# Patient Record
Sex: FEMALE | Race: WHITE | Employment: OTHER | URBAN - METROPOLITAN AREA
[De-identification: names, ages, dates, MRNs, and addresses within clinical notes are randomized per-mention and may not be internally consistent; named-entity substitution may affect disease eponyms.]

---

## 2017-06-09 DIAGNOSIS — M41.20 OTHER IDIOPATHIC SCOLIOSIS: ICD-10-CM

## 2017-06-09 DIAGNOSIS — M48.061 SPINAL STENOSIS OF LUMBAR REGION: ICD-10-CM

## 2017-06-09 RX ORDER — TRAMADOL HYDROCHLORIDE 50 MG/1
50-100 TABLET ORAL EVERY 6 HOURS PRN
Qty: 40 TABLET | Refills: 0 | Status: SHIPPED | OUTPATIENT
Start: 2017-06-09 | End: 2018-05-03

## 2017-11-16 DIAGNOSIS — Z87.11 HISTORY OF GASTRIC ULCER: ICD-10-CM

## 2017-11-16 DIAGNOSIS — R10.13 DYSPEPSIA: ICD-10-CM

## 2017-11-16 RX ORDER — NICOTINE POLACRILEX 4 MG/1
20 GUM, CHEWING ORAL DAILY
Qty: 90 TABLET | Refills: 0 | Status: SHIPPED | OUTPATIENT
Start: 2017-11-16 | End: 2018-02-12

## 2018-02-12 DIAGNOSIS — Z87.11 HISTORY OF GASTRIC ULCER: ICD-10-CM

## 2018-02-12 DIAGNOSIS — R10.13 DYSPEPSIA: ICD-10-CM

## 2018-02-12 RX ORDER — NICOTINE POLACRILEX 4 MG/1
20 GUM, CHEWING ORAL DAILY
Qty: 90 TABLET | Refills: 0 | Status: SHIPPED | OUTPATIENT
Start: 2018-02-12 | End: 2018-05-03

## 2018-02-12 NOTE — TELEPHONE ENCOUNTER
OMEPRAZOLE---LAST RELATED APPOINTMENT AND SEEN WAS ON 11/17/16, PATIENT IS DUE FOR AN APPOINTMENT, I WILL CONTACT THE PATIENT

## 2018-05-03 ENCOUNTER — OFFICE VISIT (OUTPATIENT)
Dept: FAMILY MEDICINE | Facility: CLINIC | Age: 78
End: 2018-05-03

## 2018-05-03 VITALS
SYSTOLIC BLOOD PRESSURE: 118 MMHG | BODY MASS INDEX: 21.13 KG/M2 | HEART RATE: 60 BPM | HEIGHT: 64 IN | WEIGHT: 123.8 LBS | DIASTOLIC BLOOD PRESSURE: 70 MMHG

## 2018-05-03 DIAGNOSIS — M48.062 SPINAL STENOSIS OF LUMBAR REGION WITH NEUROGENIC CLAUDICATION: ICD-10-CM

## 2018-05-03 DIAGNOSIS — M85.859 OSTEOPENIA OF FEMORAL NECK: Primary | ICD-10-CM

## 2018-05-03 DIAGNOSIS — R10.13 DYSPEPSIA: ICD-10-CM

## 2018-05-03 DIAGNOSIS — Z87.11 HISTORY OF GASTRIC ULCER: ICD-10-CM

## 2018-05-03 LAB
% GRANULOCYTES: 66.5 % (ref 42.2–75.2)
HCT VFR BLD AUTO: 32.4 % (ref 35–46)
HEMOGLOBIN: 10.6 G/DL (ref 11.8–15.5)
LYMPHOCYTES NFR BLD AUTO: 24.7 % (ref 20.5–51.1)
MCH RBC QN AUTO: 27.1 PG (ref 27–31)
MCHC RBC AUTO-ENTMCNC: 32.9 G/DL (ref 33–37)
MCV RBC AUTO: 82.4 FL (ref 80–100)
MONOCYTES NFR BLD AUTO: 8.8 % (ref 1.7–9.3)
PLATELET # BLD AUTO: 282 K/UL (ref 140–450)
RBC # BLD AUTO: 3.93 X10/CMM (ref 3.7–5.2)
WBC # BLD AUTO: 4 X10/CMM (ref 3.8–11)

## 2018-05-03 PROCEDURE — 36415 COLL VENOUS BLD VENIPUNCTURE: CPT | Performed by: FAMILY MEDICINE

## 2018-05-03 PROCEDURE — 85025 COMPLETE CBC W/AUTO DIFF WBC: CPT | Performed by: FAMILY MEDICINE

## 2018-05-03 PROCEDURE — 99214 OFFICE O/P EST MOD 30 MIN: CPT | Performed by: FAMILY MEDICINE

## 2018-05-03 RX ORDER — NICOTINE POLACRILEX 4 MG/1
GUM, CHEWING ORAL
Qty: 100 TABLET | Refills: 3 | Status: SHIPPED | OUTPATIENT
Start: 2018-05-03 | End: 2018-05-17

## 2018-05-03 RX ORDER — TRAMADOL HYDROCHLORIDE 50 MG/1
50-100 TABLET ORAL EVERY 6 HOURS PRN
Qty: 40 TABLET | Refills: 0 | Status: SHIPPED | OUTPATIENT
Start: 2018-05-03 | End: 2018-07-12

## 2018-05-03 RX ORDER — PREDNISONE 20 MG/1
20 TABLET ORAL 2 TIMES DAILY
Qty: 10 TABLET | Refills: 0 | Status: SHIPPED | OUTPATIENT
Start: 2018-05-03 | End: 2018-07-12

## 2018-05-03 RX ORDER — NICOTINE POLACRILEX 4 MG/1
20 GUM, CHEWING ORAL DAILY
Qty: 90 TABLET | Refills: 0 | Status: SHIPPED | OUTPATIENT
Start: 2018-05-03 | End: 2018-05-03

## 2018-05-03 NOTE — PROGRESS NOTES
"Problem(s) Oriented visit        SUBJECTIVE:                                                    Zo Chan is a 77 year old female who presents to clinic today for medication refill. She has scoliosis and spinal stenosis. She has pain that radiates into her right buttock and leg. It feels okay if she sits, is worse when she stands or walks, typically felt as a dull ache, but can get sharp. Advil helps, but is too hard on her stomach. She has had good benefit with 1/2 tramadol when she has taken it in the past.     She also reports history of dyspepsia and gastric ulcer, so she needs to be careful with medications she takes. She had GI bleed in the past with resultant anemia. Her last blood test was in September 2016.      Problem list, Medication list, Allergies, and Medical/Social/Surgical histories reviewed in EPIC and updated as appropriate.   Additional history: as documented    ROS:  5 point ROS completed and negative except noted above, including Gen, CV, Resp, GI, MS      Histories:   Patient Active Problem List   Diagnosis     Idiopathic scoliosis     Anemia     Iron deficiency anemia due to chronic blood loss     Gastrointestinal hemorrhage associated with gastric ulcer     Spinal stenosis of lumbar region     History reviewed. No pertinent surgical history.    Social History   Substance Use Topics     Smoking status: Never Smoker     Smokeless tobacco: Never Used     Alcohol use No     History reviewed. No pertinent family history.        OBJECTIVE:                                                    /70  Pulse 60  Ht 1.632 m (5' 4.25\")  Wt 56.2 kg (123 lb 12.8 oz)  BMI 21.09 kg/m2  Body mass index is 21.09 kg/(m^2).   GENERAL APPEARANCE: Alert, no acute distress  RESP: lungs clear to auscultation   CV: normal rate, regular rhythm, no murmur or gallop  ABDOMEN: soft, no organomegaly, masses or tenderness  MS: marked scoliosis changes, motor strength in the LE is 5/5 and equal. Tender to " palpation in the right buttock and prox post thigh, Negative straight leg raise bilaterally  NEURO: Alert, oriented, speech and mentation normal  PSYCH: mentation appears normal, affect and mood normal   Labs Resulted Today:   Results for orders placed or performed in visit on 05/03/18   CBC with Diff/Plt ([facility])   Result Value Ref Range    WBC x10/cmm 4.0 3.8 - 11.0 x10/cmm    % Lymphocytes 24.7 20.5 - 51.1 %    % Monocytes 8.8 1.7 - 9.3 %    % Granulocytes 66.5 42.2 - 75.2 %    RBC x10/cmm 3.93 3.7 - 5.2 x10/cmm    Hemoglobin 10.6 (A) 11.8 - 15.5 g/dl    Hematocrit 32.4 (A) 35 - 46 %    MCV 82.4 80 - 100 fL    MCH 27.1 27.0 - 31.0 pg    MCHC 32.9 (A) 33.0 - 37.0 g/dL    Platelet Count 282 140 - 450 K/uL     ASSESSMENT/PLAN:                                                        Zo was seen today for recheck medication and imm/inj.    Diagnoses and all orders for this visit:    Osteopenia of femoral neck  -     traMADol (ULTRAM) 50 MG tablet; Take 1-2 tablets ( mg) by mouth every 6 hours as needed for moderate pain  -     Vitamin D  25-Hydroxy ([facility])  -     Referral to [facility] for DEXA. If worsening, recommend starting Prolia.    Dyspepsia, need to take caution starting prednisone.   -     CBC with Diff/Plt ([facility]), establish baseline H/H, increase omeprazole to BID when taking prednisone.  -     omeprazole 20 MG tablet; 1-2 po daily    History of gastric ulcer  -     omeprazole 20 MG tablet; 1-2 po daily    Spinal stenosis of lumbar region with neurogenic claudication  -     traMADol (ULTRAM) 50 MG tablet; Take 1-2 tablets ( mg) by mouth every 6 hours as needed for moderate pain  -     predniSONE (DELTASONE) 20 MG tablet; Take 1 tablet (20 mg) by mouth 2 times daily  See below.       Patient Instructions   Take prednisone with food.   While taking prednisone, please increase omeprazole to twice daily.      The following health maintenance items are reviewed in Epic and correct as of  today:  Health Maintenance   Topic Date Due     TETANUS IMMUNIZATION (SYSTEM ASSIGNED)  12/12/1959     ADVANCE DIRECTIVE PLANNING Q5 YRS  12/12/1996     PNEUMOCOCCAL (1 of 2 - PCV13) 12/12/2006     FALL RISK ASSESSMENT  01/13/2017     INFLUENZA VACCINE (Season Ended) 09/01/2018     LIPID SCREEN Q5 YR FEMALE (SYSTEM ASSIGNED)  01/12/2021     DEXA SCAN SCREENING (SYSTEM ASSIGNED)  Completed       Allison Maxwell MD  [facility]  Family Practice  [facility]  [phone]    For any issues my office # is [phone]

## 2018-05-03 NOTE — MR AVS SNAPSHOT
After Visit Summary   5/3/2018    Mrs. Zo Chna    MRN: [id number]           Patient Information     Date Of Birth          1940        Visit Information        Provider Department      5/3/2018 12:30 PM Allison Maxwell MD [facility]        Today's Diagnoses     Osteopenia of femoral neck    -  1    Dyspepsia        History of gastric ulcer        Spinal stenosis of lumbar region with neurogenic claudication          Care Instructions    Take prednisone with food.   While taking prednisone, please increase omeprazole to twice daily.          Follow-ups after your visit        Who to contact     If you have questions or need follow up information about today's clinic visit or your schedule please contact [facility] directly at [phone].  Normal or non-critical lab and imaging results will be communicated to you by [employer], letter or phone within 4 business days after the clinic has received the results. If you do not hear from us within 7 days, please contact the clinic through [employer] or phone. If you have a critical or abnormal lab result, we will notify you by phone as soon as possible.  Submit refill requests through [employer] or call your pharmacy and they will forward the refill request to us. Please allow 3 business days for your refill to be completed.          Additional Information About Your Visit        MyChart Information     [employer] gives you secure access to your electronic health record. If you see a primary care provider, you can also send messages to your care team and make appointments. If you have questions, please call your primary care clinic.  If you do not have a primary care provider, please call [phone] and they will assist you.        Care EveryWhere ID     This is your Care EveryWhere ID. This could be used by other organizations to access your [facility] medical records  [id number]        Your Vitals Were     Pulse  "Height BMI (Body Mass Index)             60 1.632 m (5' 4.25\") 21.09 kg/m2          Blood Pressure from Last 3 Encounters:   05/03/18 118/70   11/17/16 118/78   09/16/16 120/80    Weight from Last 3 Encounters:   05/03/18 56.2 kg (123 lb 12.8 oz)   11/17/16 55.3 kg (122 lb)   09/16/16 55.8 kg (123 lb)              We Performed the Following     CBC with Diff/Plt ([facility])     Vitamin D  25-Hydroxy ([facility])          Today's Medication Changes          These changes are accurate as of 5/3/18  1:06 PM.  If you have any questions, ask your nurse or doctor.               Start taking these medicines.        Dose/Directions    omeprazole 20 MG tablet   Used for:  Dyspepsia, History of gastric ulcer   Started by:  Allison Maxwell MD        1-2 po daily   Quantity:  100 tablet   Refills:  3       predniSONE 20 MG tablet   Commonly known as:  DELTASONE   Used for:  Spinal stenosis of lumbar region with neurogenic claudication   Started by:  Allison Maxwell MD        Dose:  20 mg   Take 1 tablet (20 mg) by mouth 2 times daily   Quantity:  10 tablet   Refills:  0            Where to get your medicines      These medications were sent to [address] [address]    Hours:  Tech issues with their phone system Phone:  [phone]     omeprazole 20 MG tablet    predniSONE 20 MG tablet         Some of these will need a paper prescription and others can be bought over the counter.  Ask your nurse if you have questions.     Bring a paper prescription for each of these medications     traMADol 50 MG tablet               Information about OPIOIDS     PRESCRIPTION OPIOIDS: WHAT YOU NEED TO KNOW   You have a prescription for an opioid (narcotic) pain medicine. Opioids can cause addiction. If you have a history of chemical dependency of any type, you are at a higher risk of becoming addicted to opioids. Only take this medicine after all other options " have been tried. Take it for as short a time and as few doses as possible.     Do not:    Drive. If you drive while taking these medicines, you could be arrested for driving under the influence (DUI).    Operate heavy machinery    Do any other dangerous activities while taking these medicines.     Drink any alcohol while taking these medicines.      Take with any other medicines that contain acetaminophen. Read all labels carefully. Look for the word  acetaminophen  or  Tylenol.  Ask your pharmacist if you have questions or are unsure.    Store your pills in a secure place, locked if possible. We will not replace any lost or stolen medicine. If you don t finish your medicine, please throw away (dispose) as directed by your pharmacist. The [place] Pollution Control Agency has more information about safe disposal: https://www.pca.[facility].[place]/living-green/managing-unwanted-medications    All opioids tend to cause constipation. Drink plenty of water and eat foods that have a lot of fiber, such as fruits, vegetables, prune juice, apple juice and high-fiber cereal. Take a laxative (Miralax, milk of magnesia, Colace, Senna) if you don t move your bowels at least every other day.          Primary Care Provider Office Phone # Fax #    Allison Ines Maxwell [phone] [address] [place] [address]        Equal Access to Services     DARBY SHAW AH: Hadii soila huizar hadasho Soomaali, waaxda luqadaha, qaybta kaalmada adeegyada, veronica cadet hayaustin rivero. So [facility] [phone].    ATENCIÓN: Si habla español, tiene a araiza disposición servicios gratuitos de asistencia lingüística. Llame al [phone].    We comply with applicable federal civil rights laws and [place] laws. We do not discriminate on the basis of race, color, national origin, age, disability, sex, sexual orientation, or gender identity.            Thank you!     Thank you for choosing [facility]   for your care. Our goal is always to provide you with excellent care. Hearing back from our patients is one way we can continue to improve our services. Please take a few minutes to complete the written survey that you may receive in the mail after your visit with us. Thank you!             Your Updated Medication List - Protect others around you: Learn how to safely use, store and throw away your medicines at www.disposemymeds.org.          This list is accurate as of 5/3/18  1:06 PM.  Always use your most recent med list.                   Brand Name Dispense Instructions for use Diagnosis    B-12 PO      Take 1 tablet by mouth daily        CLARITIN-D 12 HOUR PO      Take 1 tablet by mouth as needed        magnesium 250 MG tablet     30 tablet    Take 1 tablet by mouth daily    Low magnesium levels       OMEGA-3 FISH OIL PO      Take 2 capsules by mouth daily        omeprazole 20 MG tablet     100 tablet    1-2 po daily    Dyspepsia, History of gastric ulcer       predniSONE 20 MG tablet    DELTASONE    10 tablet    Take 1 tablet (20 mg) by mouth 2 times daily    Spinal stenosis of lumbar region with neurogenic claudication       traMADol 50 MG tablet    ULTRAM    40 tablet    Take 1-2 tablets ( mg) by mouth every 6 hours as needed for moderate pain    Spinal stenosis of lumbar region with neurogenic claudication, Osteopenia of femoral neck       triamcinolone 0.1 % ointment    KENALOG    80 g    Apply sparingly to affected area BID    Dermatitis       vitamin D 2000 units tablet     100 tablet    Take 2 tabs po qd.    Low vitamin D level

## 2018-05-04 LAB — VITAMIN D, 25-HYDROXY: 43.5 NG/ML (ref 30–100)

## 2018-05-08 ENCOUNTER — TELEPHONE (OUTPATIENT)
Dept: FAMILY MEDICINE | Facility: CLINIC | Age: 78
End: 2018-05-08

## 2018-05-08 DIAGNOSIS — D64.9 LOW HEMOGLOBIN: Primary | ICD-10-CM

## 2018-05-08 DIAGNOSIS — D64.9 LOW HEMATOCRIT: ICD-10-CM

## 2018-05-08 NOTE — TELEPHONE ENCOUNTER
----- Message from Allison Maxwell MD sent at 5/4/2018 10:49 AM CDT -----  Blood counts are low again, we need to find out why. To do so, we should check your stool for blood. If you come by our office we can give you hemassure to check for this. We should also check your iron levels and B12 level.    Vitamin D level is pretty good. I mentioned that with your degree of osteoporosis I wanted your level to be in the 50-70 range. You could increase your vitamin D supplement by and extra 2,000 IU 2-3 times weekly to achieve this.

## 2018-05-08 NOTE — TELEPHONE ENCOUNTER
Patient returned phone call to discuss lab results.  She had previously viewed results via [employer].  She is scheduled for lab appointment on 5/10/18 for labs and will  Hemosure kit at that time as well. She is also advised to increase vitamin D by 2000IU twice weekly.  Liza Carr

## 2018-05-10 DIAGNOSIS — D64.9 LOW HEMOGLOBIN: ICD-10-CM

## 2018-05-10 DIAGNOSIS — D64.9 LOW HEMATOCRIT: ICD-10-CM

## 2018-05-10 PROCEDURE — 36415 COLL VENOUS BLD VENIPUNCTURE: CPT | Performed by: FAMILY MEDICINE

## 2018-05-11 LAB
IRON BINDING CAP: 387 UG/DL (ref 250–450)
IRON SATURATION: 7 % (ref 15–55)
IRON: 26 UG/DL (ref 27–139)
UIBC: 361 UG/DL (ref 118–369)
VIT B12 SERPL-MCNC: >2000 PG/ML (ref 232–1245)

## 2018-05-14 ENCOUNTER — TELEPHONE (OUTPATIENT)
Dept: FAMILY MEDICINE | Facility: CLINIC | Age: 78
End: 2018-05-14

## 2018-05-14 DIAGNOSIS — K59.00 CONSTIPATION: ICD-10-CM

## 2018-05-14 DIAGNOSIS — E61.1 LOW IRON: Primary | ICD-10-CM

## 2018-05-14 RX ORDER — FERROUS SULFATE 325(65) MG
325 TABLET ORAL 2 TIMES DAILY
Qty: 60 TABLET | Refills: 2 | COMMUNITY
Start: 2018-05-14 | End: 2020-01-01

## 2018-05-14 RX ORDER — ASPIRIN 81 MG
TABLET, DELAYED RELEASE (ENTERIC COATED) ORAL
Qty: 60 TABLET | Refills: 1 | COMMUNITY
Start: 2018-05-14 | End: 2018-07-12

## 2018-05-14 NOTE — TELEPHONE ENCOUNTER
Called patient with results and she had already read  them on [employer].  She will start the ferrous sulfate and colace.  Patient wants to hold off on the scheduling of the EGD and colonoscopy until the result of hemosure gets back-patient will drop off tomorrow.  Informed patient she will most likely still need the tests and should make sure that they get scheduled.  Patient will also stop her Vit B12 for awhile.

## 2018-05-15 DIAGNOSIS — D64.9 LOW HEMOGLOBIN: ICD-10-CM

## 2018-05-15 DIAGNOSIS — D64.9 LOW HEMATOCRIT: ICD-10-CM

## 2018-05-15 LAB — HEMOCCULT STL QL: NORMAL

## 2018-05-15 PROCEDURE — 82274 ASSAY TEST FOR BLOOD FECAL: CPT | Performed by: FAMILY MEDICINE

## 2018-05-17 ENCOUNTER — TRANSFERRED RECORDS (OUTPATIENT)
Dept: FAMILY MEDICINE | Facility: CLINIC | Age: 78
End: 2018-05-17

## 2018-05-17 DIAGNOSIS — Z87.11 HISTORY OF GASTRIC ULCER: ICD-10-CM

## 2018-05-17 DIAGNOSIS — R10.13 DYSPEPSIA: ICD-10-CM

## 2018-05-17 RX ORDER — NICOTINE POLACRILEX 4 MG/1
GUM, CHEWING ORAL
Qty: 100 TABLET | Refills: 3 | Status: SHIPPED | OUTPATIENT
Start: 2018-05-17 | End: 2018-09-14

## 2018-05-24 ENCOUNTER — TELEPHONE (OUTPATIENT)
Dept: FAMILY MEDICINE | Facility: CLINIC | Age: 78
End: 2018-05-24

## 2018-05-24 NOTE — TELEPHONE ENCOUNTER
Called patient with results of bone dexa scan.  Informed her of mild osteoporosis in spine and R hip and moderate osteoporosis of L hip.  Recommended is for patient to start Prolia.  Patient says she wants to research this and think about it and will get back to us if she wants to start this.  Informed her that because of her scoliosis if she fell it could be very devastating for her.

## 2018-07-12 ENCOUNTER — OFFICE VISIT (OUTPATIENT)
Dept: FAMILY MEDICINE | Facility: CLINIC | Age: 78
End: 2018-07-12

## 2018-07-12 VITALS
DIASTOLIC BLOOD PRESSURE: 68 MMHG | SYSTOLIC BLOOD PRESSURE: 118 MMHG | BODY MASS INDEX: 20.98 KG/M2 | HEART RATE: 68 BPM | WEIGHT: 123.2 LBS

## 2018-07-12 DIAGNOSIS — E55.9 VITAMIN D DEFICIENCY: ICD-10-CM

## 2018-07-12 DIAGNOSIS — K25.4 GASTROINTESTINAL HEMORRHAGE ASSOCIATED WITH GASTRIC ULCER: ICD-10-CM

## 2018-07-12 DIAGNOSIS — M85.859 OSTEOPENIA OF FEMORAL NECK: ICD-10-CM

## 2018-07-12 DIAGNOSIS — M48.062 SPINAL STENOSIS OF LUMBAR REGION WITH NEUROGENIC CLAUDICATION: ICD-10-CM

## 2018-07-12 DIAGNOSIS — D50.0 IRON DEFICIENCY ANEMIA DUE TO CHRONIC BLOOD LOSS: Primary | ICD-10-CM

## 2018-07-12 LAB
% GRANULOCYTES: 70 % (ref 42.2–75.2)
HCT VFR BLD AUTO: 31.4 % (ref 35–46)
HEMOGLOBIN: 10.2 G/DL (ref 11.8–15.5)
LYMPHOCYTES NFR BLD AUTO: 23 % (ref 20.5–51.1)
MCH RBC QN AUTO: 26.3 PG (ref 27–31)
MCHC RBC AUTO-ENTMCNC: 32.6 G/DL (ref 33–37)
MCV RBC AUTO: 80.5 FL (ref 80–100)
MONOCYTES NFR BLD AUTO: 7 % (ref 1.7–9.3)
PLATELET # BLD AUTO: 302 K/UL (ref 140–450)
RBC # BLD AUTO: 3.9 X10/CMM (ref 3.7–5.2)
WBC # BLD AUTO: 3.6 X10/CMM (ref 3.8–11)

## 2018-07-12 PROCEDURE — 99214 OFFICE O/P EST MOD 30 MIN: CPT | Performed by: FAMILY MEDICINE

## 2018-07-12 PROCEDURE — 36415 COLL VENOUS BLD VENIPUNCTURE: CPT | Performed by: FAMILY MEDICINE

## 2018-07-12 PROCEDURE — 85025 COMPLETE CBC W/AUTO DIFF WBC: CPT | Performed by: FAMILY MEDICINE

## 2018-07-12 RX ORDER — TRAMADOL HYDROCHLORIDE 50 MG/1
50-100 TABLET ORAL EVERY 6 HOURS PRN
Qty: 60 TABLET | Refills: 0 | Status: SHIPPED | OUTPATIENT
Start: 2018-07-12 | End: 2018-11-28

## 2018-07-12 NOTE — PROGRESS NOTES
Problem(s) Oriented visit        SUBJECTIVE:                                                    Zo Chan is a 77 year old female who presents to clinic today for follow up on labs. She had a lower blood count and was asked to set up an EGD. She instead stopped taking Advil and wanted to have her blood counts rechecked prior to the EGD. She had prior large gastric ulcer. She has severe spinal stenosis and scoliosis and cannot get any effect from the Tylenol. She does get adequate relief from Tramadol, taking 1/2 tab, and never drives after taking this. She does not take it every day. She is taking iron supplement, but this makes her constipated. She is trying to drink enough water and taking a fiber supplement.       Problem list, Medication list, Allergies, and Medical/Social/Surgical histories reviewed in EPIC and updated as appropriate.   Additional history: as documented    ROS:  5 point ROS completed and negative except noted above, including Gen, CV, Resp, GI, MS      Histories:   Patient Active Problem List   Diagnosis     Idiopathic scoliosis     Anemia     Iron deficiency anemia due to chronic blood loss     Gastrointestinal hemorrhage associated with gastric ulcer     Spinal stenosis of lumbar region     Vitamin D deficiency     No past surgical history on file.    Social History   Substance Use Topics     Smoking status: Never Smoker     Smokeless tobacco: Never Used     Alcohol use No     No family history on file.        OBJECTIVE:                                                    /68  Pulse 68  Wt 55.9 kg (123 lb 3.2 oz)  BMI 20.98 kg/m2  Body mass index is 20.98 kg/(m^2).   GENERAL APPEARANCE: Alert, no acute distress  RESP: lungs clear to auscultation   CV: normal rate, regular rhythm, no murmur or gallop  ABDOMEN: soft, no organomegaly, masses or tenderness  MS: changes due to spinal stenosis and scoliosis noted  NEURO: Alert, oriented, speech and mentation normal  PSYCH:  mentation appears normal, affect and mood normal   Labs Resulted Today:   Results for orders placed or performed in visit on 05/15/18   Hemosure - IFOB ([facility])   Result Value Ref Range    Occult Blood neg neg     ASSESSMENT/PLAN:                                                        Zo was seen today for recheck.    Diagnoses and all orders for this visit:    Iron deficiency anemia due to chronic blood loss  -     CBC with Diff/Plt ([facility])  -     Iron and TIBC ([facility])  -     Vitamin B12 ([facility])  Recheck all, hopefully with improved blood counts. If still iron deficiency anemia will need to arrange for evaluation of colon and EGD.    Spinal stenosis of lumbar region with neurogenic claudication  -     traMADol (ULTRAM) 50 MG tablet; Take 1-2 tablets ( mg) by mouth every 6 hours as needed for moderate pain    Osteopenia of femoral neck  -     traMADol (ULTRAM) 50 MG tablet; Take 1-2 tablets ( mg) by mouth every 6 hours as needed for moderate pain    Gastrointestinal hemorrhage associated with gastric ulcer  Feeling better, hopefully labs will reflect this.    Vitamin D deficiency  -     Vitamin D  25-Hydroxy ([facility])        There are no Patient Instructions on file for this visit.    The following health maintenance items are reviewed in Epic and correct as of today:  Health Maintenance   Topic Date Due     PHQ-2 Q1 YR  12/12/1952     TETANUS IMMUNIZATION (SYSTEM ASSIGNED)  12/12/1959     ADVANCE DIRECTIVE PLANNING Q5 YRS  12/12/1996     PNEUMOCOCCAL (1 of 2 - PCV13) 12/12/2006     INFLUENZA VACCINE (1) 09/01/2018     FALL RISK ASSESSMENT  05/03/2019     LIPID SCREEN Q5 YR FEMALE (SYSTEM ASSIGNED)  01/12/2021     DEXA SCAN SCREENING (SYSTEM ASSIGNED)  Completed       Allison Maxwell MD  [facility]  Family Practice  [facility]  [phone]    For any issues my office # is [phone]

## 2018-07-12 NOTE — MR AVS SNAPSHOT
After Visit Summary   7/12/2018    Mrs. Zo Chan    MRN: [id number]           Patient Information     Date Of Birth          1940        Visit Information        Provider Department      7/12/2018 12:30 PM Allison Maxwell MD [facility]        Today's Diagnoses     Iron deficiency anemia due to chronic blood loss    -  1    Spinal stenosis of lumbar region with neurogenic claudication        Osteopenia of femoral neck        Gastrointestinal hemorrhage associated with gastric ulcer        Vitamin D deficiency           Follow-ups after your visit        Who to contact     If you have questions or need follow up information about today's clinic visit or your schedule please contact [facility] directly at [phone].  Normal or non-critical lab and imaging results will be communicated to you by Picturkhart, letter or phone within 4 business days after the clinic has received the results. If you do not hear from us within 7 days, please contact the clinic through [employer] [place] [place] [employer] or phone. If you have a critical or abnormal lab result, we will notify you by phone as soon as possible.  Submit refill requests through [employer]iwinSoft or call your pharmacy and they will forward the refill request to us. Please allow 3 business days for your refill to be completed.          Additional Information About Your Visit        MyChart Information     [employer] gives you secure access to your electronic health record. If you see a primary care provider, you can also send messages to your care team and make appointments. If you have questions, please call your primary care clinic.  If you do not have a primary care provider, please call [phone] and they will assist you.        Care EveryWhere ID     This is your Care EveryWhere ID. This could be used by other organizations to access your [facility] medical records  [id number]        Your Vitals Were     Pulse BMI (Body Mass Index)                 68 20.98 kg/m2           Blood Pressure from Last 3 Encounters:   07/12/18 118/68   05/03/18 118/70   11/17/16 118/78    Weight from Last 3 Encounters:   07/12/18 55.9 kg (123 lb 3.2 oz)   05/03/18 56.2 kg (123 lb 12.8 oz)   11/17/16 55.3 kg (122 lb)              We Performed the Following     CBC with Diff/Plt ([facility])     Iron and TIBC ([facility])     Vitamin B12 ([facility])     Vitamin D  25-Hydroxy ([facility])          Where to get your medicines      Some of these will need a paper prescription and others can be bought over the counter.  Ask your nurse if you have questions.     Bring a paper prescription for each of these medications     traMADol 50 MG tablet         Information about OPIOIDS     PRESCRIPTION OPIOIDS: WHAT YOU NEED TO KNOW   We gave you an opioid (narcotic) pain medicine. It is important to manage your pain, but opioids are not always the best choice. You should first try all the other options your care team gave you. Take this medicine for as short a time (and as few doses) as possible.     These medicines have risks:    DO NOT drive when on new or higher doses of pain medicine. These medicines can affect your alertness and reaction times, and you could be arrested for driving under the influence (DUI). If you need to use opioids long-term, talk to your care team about driving.    DO NOT operate heave machinery    DO NOT do any other dangerous activities while taking these medicines.     DO NOT drink any alcohol while taking these medicines.      If the opioid prescribed includes acetaminophen, DO NOT take with any other medicines that contain acetaminophen. Read all labels carefully. Look for the word  acetaminophen  or  Tylenol.  Ask your pharmacist if you have questions or are unsure.    You can get addicted to pain medicines, especially if you have a history of addiction (chemical, alcohol or substance dependence). Talk to your care team about ways to reduce this risk.    Store your pills  in a secure place, locked if possible. We will not replace any lost or stolen medicine. If you don t finish your medicine, please throw away (dispose) as directed by your pharmacist. The [place] Pollution Control Agency has more information about safe disposal: https://www.pca.[facility].[place].us/living-green/managing-unwanted-medications.     All opioids tend to cause constipation. Drink plenty of water and eat foods that have a lot of fiber, such as fruits, vegetables, prune juice, apple juice and high-fiber cereal. Take a laxative (Miralax, milk of magnesia, Colace, Senna) if you don t move your bowels at least every other day.          Primary Care Provider Office Phone # Fax #    Allison Ines Maxwell [phone] [address] [place] [address]        Equal Access to Services     DARBY SHAW : Hadii osila huizar hadasho Sobecky, waaxda luqadaha, qaybta kaalmada adeegyada, veronica cadet hayaustin kasper . So [facility] [phone].    ATENCIÓN: Si habla español, tiene a araiza disposición servicios gratuitos de asistencia lingüística. Llame al [phone].    We comply with applicable federal civil rights laws and [place] laws. We do not discriminate on the basis of race, color, national origin, age, disability, sex, sexual orientation, or gender identity.            Thank you!     Thank you for choosing [facility]  for your care. Our goal is always to provide you with excellent care. Hearing back from our patients is one way we can continue to improve our services. Please take a few minutes to complete the written survey that you may receive in the mail after your visit with us. Thank you!             Your Updated Medication List - Protect others around you: Learn how to safely use, store and throw away your medicines at www.disposemymeds.org.          This list is accurate as of 7/12/18 11:59 PM.  Always use your most recent med list.                   Brand Name  Dispense Instructions for use Diagnosis    CLARITIN-D 12 HOUR PO      Take 1 tablet by mouth as needed        ferrous sulfate 325 (65 Fe) MG tablet    IRON    60 tablet    Take 1 tablet (325 mg) by mouth 2 times daily    Low iron       magnesium 250 MG tablet     30 tablet    Take 1 tablet by mouth daily    Low magnesium levels       OMEGA-3 FISH OIL PO      Take 2 capsules by mouth daily        omeprazole 20 MG tablet     100 tablet    1-2 po daily    Dyspepsia, History of gastric ulcer       traMADol 50 MG tablet    ULTRAM    60 tablet    Take 1-2 tablets ( mg) by mouth every 6 hours as needed for moderate pain    Spinal stenosis of lumbar region with neurogenic claudication, Osteopenia of femoral neck       triamcinolone 0.1 % ointment    KENALOG    80 g    Apply sparingly to affected area BID    Dermatitis       vitamin D 2000 units tablet     100 tablet    Take 2 tabs po qd.    Low vitamin D level

## 2018-07-13 LAB
IRON BINDING CAP: 432 UG/DL (ref 250–450)
IRON SATURATION: 6 % (ref 15–55)
IRON: 26 UG/DL (ref 27–139)
UIBC: 406 UG/DL (ref 118–369)
VIT B12 SERPL-MCNC: 1331 PG/ML (ref 232–1245)
VITAMIN D, 25-HYDROXY: 45.7 NG/ML (ref 30–100)

## 2018-07-16 ENCOUNTER — TELEPHONE (OUTPATIENT)
Dept: FAMILY MEDICINE | Facility: CLINIC | Age: 78
End: 2018-07-16

## 2018-07-16 DIAGNOSIS — D64.9 ANEMIA: Primary | ICD-10-CM

## 2018-07-16 NOTE — TELEPHONE ENCOUNTER
Called patient with lab results. She had read results on RageTankt.  Order put in to [facility] for upper endoscopy.  They will call patient to schedule. Patient will continue taking her Omeprazole and avoid NSAIDS.

## 2018-07-27 ENCOUNTER — TELEPHONE (OUTPATIENT)
Dept: FAMILY MEDICINE | Facility: CLINIC | Age: 78
End: 2018-07-27

## 2018-07-27 NOTE — TELEPHONE ENCOUNTER
Patient called clinic to update.  Patient was scheduled for EGD on 7/24/18 at [facility], prior to procedure /102, dropped to 198/98 with PO fluids.  Patient decided not to proceed with EGD at that time.  She calls to report that she is feeling fine and would like to not have EGD done at this time.  She has increased omeprazole to 20mg BID and is taking daily Fe. Spoke with Dr. Maxwell who does feel that EGD is still necessary and patient is encouraged to proceed with procedure-patient declines.  Discussed with patient that she should RTC next month for recheck labs and discussion with Dr. Maxwell about EGD-patient agrees and will call to schedule this.  Liza Carr

## 2018-09-13 ENCOUNTER — OFFICE VISIT (OUTPATIENT)
Dept: FAMILY MEDICINE | Facility: CLINIC | Age: 78
End: 2018-09-13

## 2018-09-13 VITALS
SYSTOLIC BLOOD PRESSURE: 126 MMHG | RESPIRATION RATE: 12 BRPM | DIASTOLIC BLOOD PRESSURE: 72 MMHG | WEIGHT: 121.6 LBS | BODY MASS INDEX: 20.71 KG/M2 | HEART RATE: 72 BPM

## 2018-09-13 DIAGNOSIS — Z13.6 SCREENING FOR CARDIOVASCULAR CONDITION: ICD-10-CM

## 2018-09-13 DIAGNOSIS — D50.0 IRON DEFICIENCY ANEMIA DUE TO CHRONIC BLOOD LOSS: Primary | ICD-10-CM

## 2018-09-13 DIAGNOSIS — E87.1 HYPONATREMIA: ICD-10-CM

## 2018-09-13 DIAGNOSIS — K27.9 PEPTIC ULCER DISEASE: ICD-10-CM

## 2018-09-13 DIAGNOSIS — E67.8 HYPERVITAMINOSIS, B COMPLEX: ICD-10-CM

## 2018-09-13 LAB
% GRANULOCYTES: 72.6 % (ref 42.2–75.2)
HCT VFR BLD AUTO: 37.8 % (ref 35–46)
HEMOGLOBIN: 12.4 G/DL (ref 11.8–15.5)
LYMPHOCYTES NFR BLD AUTO: 19.9 % (ref 20.5–51.1)
MCH RBC QN AUTO: 28.5 PG (ref 27–31)
MCHC RBC AUTO-ENTMCNC: 32.9 G/DL (ref 33–37)
MCV RBC AUTO: 86.8 FL (ref 80–100)
MONOCYTES NFR BLD AUTO: 7.5 % (ref 1.7–9.3)
PLATELET # BLD AUTO: 230 K/UL (ref 140–450)
RBC # BLD AUTO: 4.35 X10/CMM (ref 3.7–5.2)
WBC # BLD AUTO: 3.9 X10/CMM (ref 3.8–11)

## 2018-09-13 PROCEDURE — 85025 COMPLETE CBC W/AUTO DIFF WBC: CPT | Performed by: FAMILY MEDICINE

## 2018-09-13 PROCEDURE — 36415 COLL VENOUS BLD VENIPUNCTURE: CPT | Performed by: FAMILY MEDICINE

## 2018-09-13 PROCEDURE — 99213 OFFICE O/P EST LOW 20 MIN: CPT | Performed by: FAMILY MEDICINE

## 2018-09-13 NOTE — PROGRESS NOTES
Problem(s) Oriented visit        SUBJECTIVE:                                                    Zo Chan is a 77 year old female who presents to clinic today for recheck of iron deficiency anemia. She had EGD showing an ulcer. She took omeprazole and now has no abdominal pain. She has now been taking iron supplement twice daily. She has not used any Advil since this was diagnosed. She is currently using 1/2 tramadol once daily. She typically drinks three cups of caffeinated coffee daily, down from her typical all day coffee use.       Problem list, Medication list, Allergies, and Medical/Social/Surgical histories reviewed in [facility] and updated as appropriate.   Additional history: as documented    ROS:  5 point ROS completed and negative except noted above, including Gen, CV, Resp, GI, MS      Histories:   Patient Active Problem List   Diagnosis     Idiopathic scoliosis     Anemia     Iron deficiency anemia due to chronic blood loss     Gastrointestinal hemorrhage associated with gastric ulcer     Spinal stenosis of lumbar region     Vitamin D deficiency     No past surgical history on file.    Social History   Substance Use Topics     Smoking status: Never Smoker     Smokeless tobacco: Never Used     Alcohol use No     No family history on file.        OBJECTIVE:                                                    /72  Pulse 72  Resp 12  Wt 55.2 kg (121 lb 9.6 oz)  BMI 20.71 kg/m2  Body mass index is 20.71 kg/(m^2).   GENERAL APPEARANCE: Alert, no acute distress  ABDOMEN: soft, no organomegaly, masses or tenderness  MS: extremities normal, no peripheral edema  NEURO: Alert, oriented, speech and mentation normal  PSYCH: mentation appears normal, affect and mood normal   Labs Resulted Today:   Results for orders placed or performed in visit on 09/13/18   CBC with Diff/Plt ([facility])   Result Value Ref Range    WBC x10/cmm 3.9 3.8 - 11.0 x10/cmm    % Lymphocytes 19.9 (A) 20.5 - 51.1 %    % Monocytes 7.5  1.7 - 9.3 %    % Granulocytes 72.6 42.2 - 75.2 %    RBC x10/cmm 4.35 3.7 - 5.2 x10/cmm    Hemoglobin 12.4 11.8 - 15.5 g/dl    Hematocrit 37.8 35 - 46 %    MCV 86.8 80 - 100 fL    MCH 28.5 27.0 - 31.0 pg    MCHC 32.9 (A) 33.0 - 37.0 g/dL    Platelet Count 230 140 - 450 K/uL     ASSESSMENT/PLAN:                                                        Zo was seen today for results.    Diagnoses and all orders for this visit:    PUD  Continue omeprazole, lifestyle modifications were also discussed.    Iron deficiency anemia due to chronic blood loss   -     Iron and TIBC ([facility])   -     CBC with Diff/Plt ([facility])  Resolved anemia, await iron results to know if iron supplement is still appropriate.    Hypervitaminosis, B complex  -     Vitamin B12 ([facility])  Previously on vitamin B supplement, but with B12 well above normal limit.  She has stopped the vitamin B supplement and needs to know if she should restart.    Screening for cardiovascular condition  -     Comp. Metabolic Panel (14) ([facility])        There are no Patient Instructions on file for this visit.    The following health maintenance items are reviewed in Epic and correct as of today:  Health Maintenance   Topic Date Due     PHQ-2 Q1 YR  12/12/1953     TETANUS IMMUNIZATION (SYSTEM ASSIGNED)  12/12/1959     ADVANCE DIRECTIVE PLANNING Q5 YRS  12/12/1996     PNEUMOCOCCAL (1 of 2 - PCV13) 12/12/2006     INFLUENZA VACCINE (1) 09/01/2018     FALL RISK ASSESSMENT  05/03/2019     LIPID SCREEN Q5 YR FEMALE (SYSTEM ASSIGNED)  01/12/2021     DEXA SCAN SCREENING (SYSTEM ASSIGNED)  Completed       Allison Maxwell MD  [facility]  Family Practice  [facility]  [phone]    For any issues my office # is [phone]

## 2018-09-13 NOTE — MR AVS SNAPSHOT
After Visit Summary   9/13/2018    Mrs. Zo Chan    MRN: [id number]           Patient Information     Date Of Birth          1940        Visit Information        Provider Department      9/13/2018 12:30 PM Allison Maxwell MD [facility]        Today's Diagnoses     Iron deficiency anemia due to chronic blood loss    -  1    Hypervitaminosis, B complex        Screening for cardiovascular condition        Peptic ulcer disease           Follow-ups after your visit        Who to contact     If you have questions or need follow up information about today's clinic visit or your schedule please contact [facility] directly at [phone].  Normal or non-critical lab and imaging results will be communicated to you by [employer], letter or phone within 4 business days after the clinic has received the results. If you do not hear from us within 7 days, please contact the clinic through [employer] or phone. If you have a critical or abnormal lab result, we will notify you by phone as soon as possible.  Submit refill requests through [employer] or call your pharmacy and they will forward the refill request to us. Please allow 3 business days for your refill to be completed.          Additional Information About Your Visit        MyChart Information     [employer] gives you secure access to your electronic health record. If you see a primary care provider, you can also send messages to your care team and make appointments. If you have questions, please call your primary care clinic.  If you do not have a primary care provider, please call [phone] and they will assist you.        Care EveryWhere ID     This is your Care EveryWhere ID. This could be used by other organizations to access your [place] medical records  [id number]        Your Vitals Were     Pulse Respirations BMI (Body Mass Index)             72 12 20.71 kg/m2          Blood Pressure from Last 3 Encounters:    09/13/18 126/72   07/12/18 118/68   05/03/18 118/70    Weight from Last 3 Encounters:   09/13/18 55.2 kg (121 lb 9.6 oz)   07/12/18 55.9 kg (123 lb 3.2 oz)   05/03/18 56.2 kg (123 lb 12.8 oz)              We Performed the Following     CBC with Diff/Plt ([facility])     Comp. Metabolic Panel (14) ([facility])     Iron and TIBC ([facility])     Vitamin B12 ([facility])        Primary Care Provider Office Phone # Fax #    Allison Ines Maxwell [phone] [address] [place] [address]        Equal Access to Services     DARBY SHAW : Leila dobsono Krishna, waaxda luqadaha, qaybta kaalmada adeegyatony, veronica rivero. So [facility] [phone].    ATENCIÓN: Si habla español, tiene a araiza disposición servicios gratuitos de asistencia lingüística. [facility] [phone].    We comply with applicable federal civil rights laws and [place] laws. We do not discriminate on the basis of race, color, national origin, age, disability, sex, sexual orientation, or gender identity.            Thank you!     Thank you for choosing [facility]  for your care. Our goal is always to provide you with excellent care. Hearing back from our patients is one way we can continue to improve our services. Please take a few minutes to complete the written survey that you may receive in the mail after your visit with us. Thank you!             Your Updated Medication List - Protect others around you: Learn how to safely use, store and throw away your medicines at www.disposemymeds.org.          This list is accurate as of 9/13/18  1:48 PM.  Always use your most recent med list.                   Brand Name Dispense Instructions for use Diagnosis    CLARITIN-D 12 HOUR PO      Take 1 tablet by mouth as needed        ferrous sulfate 325 (65 Fe) MG tablet    IRON    60 tablet    Take 1 tablet (325 mg) by mouth 2 times daily    Low iron       magnesium 250 MG tablet     30 tablet     Take 1 tablet by mouth daily    Low magnesium levels       OMEGA-3 FISH OIL PO      Take 2 capsules by mouth daily        omeprazole 20 MG tablet     100 tablet    1-2 po daily    Dyspepsia, History of gastric ulcer       traMADol 50 MG tablet    ULTRAM    60 tablet    Take 1-2 tablets ( mg) by mouth every 6 hours as needed for moderate pain    Spinal stenosis of lumbar region with neurogenic claudication, Osteopenia of femoral neck       triamcinolone 0.1 % ointment    KENALOG    80 g    Apply sparingly to affected area BID    Dermatitis       vitamin D 2000 units tablet     100 tablet    Take 2 tabs po qd.    Low vitamin D level

## 2018-09-14 DIAGNOSIS — R10.13 DYSPEPSIA: ICD-10-CM

## 2018-09-14 DIAGNOSIS — Z87.11 HISTORY OF GASTRIC ULCER: ICD-10-CM

## 2018-09-14 LAB
ALBUMIN SERPL-MCNC: 4 G/DL (ref 3.5–4.8)
ALBUMIN/GLOB SERPL: 1.7 {RATIO} (ref 1.2–2.2)
ALP SERPL-CCNC: 61 IU/L (ref 39–117)
ALT SERPL-CCNC: 17 IU/L (ref 0–32)
AST SERPL-CCNC: 22 IU/L (ref 0–40)
BILIRUB SERPL-MCNC: 0.2 MG/DL (ref 0–1.2)
BUN SERPL-MCNC: 10 MG/DL (ref 8–27)
BUN/CREATININE RATIO: 16 (ref 12–28)
CALCIUM SERPL-MCNC: 9 MG/DL (ref 8.7–10.3)
CHLORIDE SERPLBLD-SCNC: 92 MMOL/L (ref 96–106)
CREAT SERPL-MCNC: 0.64 MG/DL (ref 0.57–1)
EGFR IF AFRICN AM: 100 ML/MIN/1.73
EGFR IF NONAFRICN AM: 87 ML/MIN/1.73
GLOBULIN, TOTAL: 2.4 G/DL (ref 1.5–4.5)
GLUCOSE SERPL-MCNC: 95 MG/DL (ref 65–99)
IRON BINDING CAP: 364 UG/DL (ref 250–450)
IRON SATURATION: 31 % (ref 15–55)
IRON: 114 UG/DL (ref 27–139)
POTASSIUM SERPL-SCNC: 4.1 MMOL/L (ref 3.5–5.2)
PROT SERPL-MCNC: 6.4 G/DL (ref 6–8.5)
SODIUM SERPL-SCNC: 129 MMOL/L (ref 134–144)
TOTAL CO2: 27 MMOL/L (ref 20–29)
UIBC: 250 UG/DL (ref 118–369)
VIT B12 SERPL-MCNC: 804 PG/ML (ref 232–1245)

## 2018-09-14 RX ORDER — NICOTINE POLACRILEX 4 MG/1
GUM, CHEWING ORAL
Qty: 90 TABLET | Refills: 1 | Status: SHIPPED | OUTPATIENT
Start: 2018-09-14 | End: 2019-04-08

## 2018-09-18 LAB — TSH BLD-ACNC: 3.49 UIU/ML (ref 0.45–4.5)

## 2018-09-27 ENCOUNTER — OFFICE VISIT (OUTPATIENT)
Dept: FAMILY MEDICINE | Facility: CLINIC | Age: 78
End: 2018-09-27

## 2018-09-27 VITALS
BODY MASS INDEX: 20.78 KG/M2 | DIASTOLIC BLOOD PRESSURE: 78 MMHG | RESPIRATION RATE: 14 BRPM | SYSTOLIC BLOOD PRESSURE: 122 MMHG | WEIGHT: 122 LBS | HEART RATE: 78 BPM

## 2018-09-27 DIAGNOSIS — Z12.11 SPECIAL SCREENING FOR MALIGNANT NEOPLASMS, COLON: ICD-10-CM

## 2018-09-27 DIAGNOSIS — E87.8 HYPOCHLOREMIA: ICD-10-CM

## 2018-09-27 DIAGNOSIS — E87.1 HYPONATREMIA: Primary | ICD-10-CM

## 2018-09-27 PROCEDURE — 99213 OFFICE O/P EST LOW 20 MIN: CPT | Performed by: FAMILY MEDICINE

## 2018-09-27 NOTE — PROGRESS NOTES
Problem(s) Oriented visit        SUBJECTIVE:                                                    Zo Chan is a 77 year old female who presents to clinic today for follow up on a few items. She had recent labs showing hypokalemia and hypochloremeia. She denies restriction of salt. No swelling.     She had significantly elevated blood pressures at endoscopy, prompting her to not do the procedure. She is no longer wearing the back brace that was chronically pushing against her upper abdomen. Her GERD symptoms are significantly better without the pressure on her upper abdomen.    She is due for colonoscopy. She is refusing this and wonders if there is another option.     SHe has a skin spot on her back that is new and wants to make sure it isn't cancerous.    Problem list, Medication list, Allergies, and Medical/Social/Surgical histories reviewed in EPIC and updated as appropriate.   Additional history: as documented    ROS:  5 point ROS completed and negative except noted above, including Gen, CV, Resp, GI, MS      Histories:   Patient Active Problem List   Diagnosis     Idiopathic scoliosis     Anemia     Iron deficiency anemia due to chronic blood loss     Gastrointestinal hemorrhage associated with gastric ulcer     Spinal stenosis of lumbar region     Vitamin D deficiency     History reviewed. No pertinent surgical history.    Social History   Substance Use Topics     Smoking status: Never Smoker     Smokeless tobacco: Never Used     Alcohol use No     History reviewed. No pertinent family history.        OBJECTIVE:                                                    /78  Pulse 78  Resp 14  Wt 55.3 kg (122 lb)  Breastfeeding? No  BMI 20.78 kg/m2  Body mass index is 20.78 kg/(m^2).   GENERAL APPEARANCE: Alert, no acute distress  NECK: No adenopathy,masses or thyromegaly  RESP: lungs clear to auscultation   CV: normal rate, regular rhythm, no murmur or gallop  ABDOMEN: soft, no organomegaly, masses  or tenderness  MS: extremities normal, no peripheral edema  Spine with marked curvature from scoliosis  SKIN: no suspicious lesions or rashes, typical appearing marga K is seen on her back   NEURO: Alert, oriented, speech and mentation normal  PSYCH: mentation appears normal, affect and mood normal   Labs Resulted Today:   Results for orders placed or performed in visit on 09/13/18   CBC with Diff/Plt ([facility]   Result Value Ref Range    WBC x10/cmm 3.9 3.8 - 11.0 x10/cmm    % Lymphocytes 19.9 (A) 20.5 - 51.1 %    % Monocytes 7.5 1.7 - 9.3 %    % Granulocytes 72.6 42.2 - 75.2 %    RBC x10/cmm 4.35 3.7 - 5.2 x10/cmm    Hemoglobin 12.4 11.8 - 15.5 g/dl    Hematocrit 37.8 35 - 46 %    MCV 86.8 80 - 100 fL    MCH 28.5 27.0 - 31.0 pg    MCHC 32.9 (A) 33.0 - 37.0 g/dL    Platelet Count 230 140 - 450 K/uL   Iron and TIBC ([facility])   Result Value Ref Range    Iron Binding Cap 364 250 - 450 ug/dL    UIBC 250 118 - 369 ug/dL    Iron 114 27 - 139 ug/dL    Iron Saturation 31 15 - 55 %    Narrative    Performed at:  01 - [facility] [address], [place], [place]  [id number]  : Leonides Lo MD, Phone:  [phone]   Vitamin B12 ([facility])   Result Value Ref Range    Vitamin B12 804 232 - 1245 pg/mL    Narrative    Performed at:  01 - [facility] [address], [place], [place]  [id number]  : Leonides Lo MD, Phone:  [phone]   Comp. Metabolic Panel (14) ([facility])   Result Value Ref Range    Glucose 95 65 - 99 mg/dL    Urea Nitrogen 10 8 - 27 mg/dL    Creatinine 0.64 0.57 - 1.00 mg/dL    eGFR If NonAfricn Am 87 >59 mL/min/1.73    eGFR If Africn Am 100 >59 mL/min/1.73    BUN/Creatinine Ratio 16 12 - 28    Sodium 129 (L) 134 - 144 mmol/L    Potassium 4.1 3.5 - 5.2 mmol/L    Chloride 92 (L) 96 - 106 mmol/L    Total CO2 27 20 - 29 mmol/L    Calcium 9.0 8.7 - 10.3 mg/dL    Protein Total 6.4 6.0 - 8.5 g/dL    Albumin 4.0 3.5 - 4.8 g/dL    Globulin, Total 2.4 1.5 - 4.5 g/dL    A/G Ratio 1.7 1.2 - 2.2     Bilirubin Total 0.2 0.0 - 1.2 mg/dL    Alkaline Phosphatase 61 39 - 117 IU/L    AST 22 0 - 40 IU/L    ALT 17 0 - 32 IU/L    Narrative    Performed at:  01 - [facility] [address], [place], [place]  [id number]  : Leonides Lo MD, Phone:  [phone]   TSH ([facility])   Result Value Ref Range    TSH 3.490 0.450 - 4.500 uIU/mL    Narrative    Performed at:  01 - [facility] [address], [place], [place]  [id number]  : Leonides Lo MD, Phone:  [phone]     ASSESSMENT/PLAN:                                                        Zo was seen today for recheck and derm problem.    Diagnoses and all orders for this visit:    Hyponatremia / Hypochloremia  -     Basic Metabolic Panel (8) ([facility])  Recheck today.    Special screening for malignant neoplasms, colon  Referral for cologuard is given.    Hx Iron Deficiency Anemia  At her last visit we showed that her iron levels and blood counts were back to normal. This will be rechecked in 2 months to verify stability        The following health maintenance items are reviewed in Epic and correct as of today:  Health Maintenance   Topic Date Due     ADVANCE DIRECTIVE PLANNING Q5 YRS  12/12/1996     FALL RISK ASSESSMENT  05/03/2019     PNEUMOCOCCAL (2 of 2 - PPSV23) 09/27/2019     PHQ-2 Q1 YR  09/27/2019     LIPID SCREEN Q5 YR FEMALE (SYSTEM ASSIGNED)  01/12/2021     TETANUS IMMUNIZATION (SYSTEM ASSIGNED)  09/27/2028     DEXA SCAN SCREENING (SYSTEM ASSIGNED)  Completed     INFLUENZA VACCINE  Addressed       Allison Maxwell MD  [facility]  Family Practice  [facility]  [phone]    For any issues my office # is [phone]

## 2018-09-27 NOTE — MR AVS SNAPSHOT
After Visit Summary   9/27/2018    Mrs. Zo Chan    MRN: [id number]           Patient Information     Date Of Birth          1940        Visit Information        Provider Department      9/27/2018 4:00 PM Allison Maxwell MD [facility]        Today's Diagnoses     Hyponatremia    -  1    Hypochloremia        Special screening for malignant neoplasms, colon           Follow-ups after your visit        Who to contact     If you have questions or need follow up information about today's clinic visit or your schedule please contact [facility] directly at [phone].  Normal or non-critical lab and imaging results will be communicated to you by Susohart, letter or phone within 4 business days after the clinic has received the results. If you do not hear from us within 7 days, please contact the clinic through [employer] or phone. If you have a critical or abnormal lab result, we will notify you by phone as soon as possible.  Submit refill requests through Collax or call your pharmacy and they will forward the refill request to us. Please allow 3 business days for your refill to be completed.          Additional Information About Your Visit        MyChart Information     Collax gives you secure access to your electronic health record. If you see a primary care provider, you can also send messages to your care team and make appointments. If you have questions, please call your primary care clinic.  If you do not have a primary care provider, please call [phone] and they will assist you.        Care EveryWhere ID     This is your Care EveryWhere ID. This could be used by other organizations to access your [facility] medical records  [id number]        Your Vitals Were     Pulse Respirations Breastfeeding? BMI (Body Mass Index)          78 14 No 20.78 kg/m2         Blood Pressure from Last 3 Encounters:   09/27/18 122/78   09/13/18 126/72   07/12/18 118/68     Weight from Last 3 Encounters:   09/27/18 55.3 kg (122 lb)   09/13/18 55.2 kg (121 lb 9.6 oz)   07/12/18 55.9 kg (123 lb 3.2 oz)              We Performed the Following     ABSTRACT COLOGUARD-NO CHARGE     Basic Metabolic Panel (8) ([facility])        Primary Care Provider Office Phone # Fax #    Allison Ines Maxwell [phone] [address] [place] [address]        Equal Access to Services     [facility]: Hadii aad ku hadasho Soomaali, waaxda luqadaha, qaybta kaalmada adeegyada, waxay chichi hayaustin kasper . So [facility] [phone].    ATENCIÓN: Si habla español, tiene a araiza disposición servicios gratuitos de asistencia lingüística. Llame al [phone].    We comply with applicable federal civil rights laws and [place] laws. We do not discriminate on the basis of race, color, national origin, age, disability, sex, sexual orientation, or gender identity.            Thank you!     Thank you for choosing [facility]  for your care. Our goal is always to provide you with excellent care. Hearing back from our patients is one way we can continue to improve our services. Please take a few minutes to complete the written survey that you may receive in the mail after your visit with us. Thank you!             Your Updated Medication List - Protect others around you: Learn how to safely use, store and throw away your medicines at www.disposemymeds.org.          This list is accurate as of 9/27/18 11:59 PM.  Always use your most recent med list.                   Brand Name Dispense Instructions for use Diagnosis    CLARITIN-D 12 HOUR PO      Take 1 tablet by mouth as needed        ferrous sulfate 325 (65 Fe) MG tablet    IRON    60 tablet    Take 1 tablet (325 mg) by mouth 2 times daily    Low iron       magnesium 250 MG tablet     30 tablet    Take 1 tablet by mouth daily    Low magnesium levels       OMEGA-3 FISH OIL PO      Take 2 capsules by mouth daily         omeprazole 20 MG tablet     90 tablet    1 capsule by mouth daily    Dyspepsia, History of gastric ulcer       traMADol 50 MG tablet    ULTRAM    60 tablet    Take 1-2 tablets ( mg) by mouth every 6 hours as needed for moderate pain    Spinal stenosis of lumbar region with neurogenic claudication, Osteopenia of femoral neck       triamcinolone 0.1 % ointment    KENALOG    80 g    Apply sparingly to affected area BID    Dermatitis       vitamin D 2000 units tablet     100 tablet    Take 2 tabs po qd.    Low vitamin D level

## 2018-09-28 LAB
BUN SERPL-MCNC: 13 MG/DL (ref 8–27)
BUN/CREATININE RATIO: 23 (ref 12–28)
CALCIUM SERPL-MCNC: 9.1 MG/DL (ref 8.7–10.3)
CHLORIDE SERPLBLD-SCNC: 95 MMOL/L (ref 96–106)
CREAT SERPL-MCNC: 0.57 MG/DL (ref 0.57–1)
EGFR IF AFRICN AM: 104 ML/MIN/1.73
EGFR IF NONAFRICN AM: 90 ML/MIN/1.73
GLUCOSE SERPL-MCNC: 95 MG/DL (ref 65–99)
POTASSIUM SERPL-SCNC: 4.5 MMOL/L (ref 3.5–5.2)
SODIUM SERPL-SCNC: 134 MMOL/L (ref 134–144)
TOTAL CO2: 26 MMOL/L (ref 20–29)

## 2018-10-08 ENCOUNTER — TRANSFERRED RECORDS (OUTPATIENT)
Dept: FAMILY MEDICINE | Facility: CLINIC | Age: 78
End: 2018-10-08

## 2018-10-08 LAB — COLOGUARD-ABSTRACT: POSITIVE

## 2018-10-15 ENCOUNTER — TELEPHONE (OUTPATIENT)
Dept: FAMILY MEDICINE | Facility: CLINIC | Age: 78
End: 2018-10-15

## 2018-10-15 DIAGNOSIS — R19.5 POSITIVE COLORECTAL CANCER SCREENING USING COLOGUARD TEST: Primary | ICD-10-CM

## 2018-10-15 NOTE — TELEPHONE ENCOUNTER
Called patient with result of positive cologuard test..  Recommended is colonoscopy.  Referral sent to [facility].  They will call patient to schedule.

## 2018-10-30 ENCOUNTER — TRANSFERRED RECORDS (OUTPATIENT)
Dept: FAMILY MEDICINE | Facility: CLINIC | Age: 78
End: 2018-10-30

## 2018-11-06 ENCOUNTER — TELEPHONE (OUTPATIENT)
Dept: FAMILY MEDICINE | Facility: CLINIC | Age: 78
End: 2018-11-06

## 2018-11-28 DIAGNOSIS — M85.859 OSTEOPENIA OF FEMORAL NECK: ICD-10-CM

## 2018-11-28 DIAGNOSIS — M48.062 SPINAL STENOSIS OF LUMBAR REGION WITH NEUROGENIC CLAUDICATION: ICD-10-CM

## 2018-11-29 RX ORDER — TRAMADOL HYDROCHLORIDE 50 MG/1
50-100 TABLET ORAL EVERY 6 HOURS PRN
Qty: 60 TABLET | Refills: 1 | Status: SHIPPED | OUTPATIENT
Start: 2018-11-29 | End: 2019-04-25

## 2019-04-08 DIAGNOSIS — Z87.11 HISTORY OF GASTRIC ULCER: ICD-10-CM

## 2019-04-08 DIAGNOSIS — R10.13 DYSPEPSIA: ICD-10-CM

## 2019-04-08 RX ORDER — NICOTINE POLACRILEX 4 MG/1
GUM, CHEWING ORAL
Qty: 90 TABLET | Refills: 1 | Status: SHIPPED | OUTPATIENT
Start: 2019-04-08 | End: 2020-01-14

## 2019-04-25 ENCOUNTER — OFFICE VISIT (OUTPATIENT)
Dept: FAMILY MEDICINE | Facility: CLINIC | Age: 79
End: 2019-04-25

## 2019-04-25 VITALS
DIASTOLIC BLOOD PRESSURE: 78 MMHG | BODY MASS INDEX: 20.2 KG/M2 | HEART RATE: 73 BPM | OXYGEN SATURATION: 96 % | WEIGHT: 118.6 LBS | RESPIRATION RATE: 16 BRPM | SYSTOLIC BLOOD PRESSURE: 122 MMHG

## 2019-04-25 DIAGNOSIS — M48.062 SPINAL STENOSIS OF LUMBAR REGION WITH NEUROGENIC CLAUDICATION: ICD-10-CM

## 2019-04-25 DIAGNOSIS — Z86.2 HISTORY OF ANEMIA: ICD-10-CM

## 2019-04-25 DIAGNOSIS — Z86.39 HISTORY OF NON ANEMIC VITAMIN B12 DEFICIENCY: Primary | ICD-10-CM

## 2019-04-25 LAB
% GRANULOCYTES: 74.3 % (ref 42.2–75.2)
HCT VFR BLD AUTO: 38.9 % (ref 35–46)
HEMOGLOBIN: 12.9 G/DL (ref 11.8–15.5)
LYMPHOCYTES NFR BLD AUTO: 20.6 % (ref 20.5–51.1)
MCH RBC QN AUTO: 30.3 PG (ref 27–31)
MCHC RBC AUTO-ENTMCNC: 33.4 G/DL (ref 33–37)
MCV RBC AUTO: 90.8 FL (ref 80–100)
MONOCYTES NFR BLD AUTO: 5.1 % (ref 1.7–9.3)
PLATELET # BLD AUTO: 249 K/UL (ref 140–450)
RBC # BLD AUTO: 4.28 X10/CMM (ref 3.7–5.2)
WBC # BLD AUTO: 3.7 X10/CMM (ref 3.8–11)

## 2019-04-25 PROCEDURE — 85025 COMPLETE CBC W/AUTO DIFF WBC: CPT | Performed by: FAMILY MEDICINE

## 2019-04-25 PROCEDURE — 99213 OFFICE O/P EST LOW 20 MIN: CPT | Performed by: FAMILY MEDICINE

## 2019-04-25 PROCEDURE — 36415 COLL VENOUS BLD VENIPUNCTURE: CPT | Performed by: FAMILY MEDICINE

## 2019-04-25 RX ORDER — TRAMADOL HYDROCHLORIDE 50 MG/1
50-100 TABLET ORAL EVERY 6 HOURS PRN
Qty: 60 TABLET | Refills: 1 | Status: SHIPPED | OUTPATIENT
Start: 2019-04-25 | End: 2019-09-26

## 2019-04-25 NOTE — LETTER
[facility] [address] [place]  [address]  Phone: [phone]    April 30, 2019      Zo Chan  [address] [address] [facility] [facility] [address]              Dear Zo,    The results from your recent visit showed WBC count remains just slightly low, as it typically is with you and does not require further work up.   Blood counts are good.   B12 is just fine without supplement, remaining mid normal.         Sincerely,     Allison Maxwell M.D.    Results for orders placed or performed in visit on 04/25/19   CBC with Diff/Plt ([facility])   Result Value Ref Range    WBC x10/cmm 3.7 (A) 3.8 - 11.0 x10/cmm    % Lymphocytes 20.6 20.5 - 51.1 %    % Monocytes 5.1 1.7 - 9.3 %    % Granulocytes 74.3 42.2 - 75.2 %    RBC x10/cmm 4.28 3.7 - 5.2 x10/cmm    Hemoglobin 12.9 11.8 - 15.5 g/dl    Hematocrit 38.9 35 - 46 %    MCV 90.8 80 - 100 fL    MCH 30.3 27.0 - 31.0 pg    MCHC 33.4 33.0 - 37.0 g/dL    Platelet Count 249 140 - 450 K/uL   Vitamin B12 ([facility])   Result Value Ref Range    Vitamin B12 770 232 - 1,245 pg/mL    Narrative    Performed at:  01 - [facility] [address], [place], [place]  [id number]  : Leonides Lo MD, Phone:  [phone]

## 2019-04-25 NOTE — PROGRESS NOTES
Problem(s) Oriented visit        SUBJECTIVE:                                                    Zo Chan is a 78 year old female who presents to clinic today for recheck of medications. She takes 1/2 Tramadol once daily for pain related to spinal stenosis. She has not increased her use. She needs refill.    She has history of gastritis and iron deficiency anemia. She had normal counts in September, but wants recheck to make sure she is stable. No current epigastric pain.    She has history of B12 deficiency, then with a high level so she stopped. She wants to make sure she is fine off of the supplement.      Problem list, Medication list, Allergies, and Medical/Social/Surgical histories reviewed in EPIC and updated as appropriate.   Additional history: as documented    ROS:  5 point ROS completed and negative except noted above, including Gen, CV, Resp, GI, MS      Histories:   Patient Active Problem List   Diagnosis     Idiopathic scoliosis     Anemia     Iron deficiency anemia due to chronic blood loss     Gastrointestinal hemorrhage associated with gastric ulcer     Spinal stenosis of lumbar region     Vitamin D deficiency     No past surgical history on file.    Social History     Tobacco Use     Smoking status: Never Smoker     Smokeless tobacco: Never Used   Substance Use Topics     Alcohol use: No     No family history on file.        OBJECTIVE:                                                    /78   Pulse 73   Resp 16   Wt 53.8 kg (118 lb 9.6 oz)   SpO2 96%   BMI 20.20 kg/m    Body mass index is 20.2 kg/m .   GENERAL APPEARANCE: Alert, no acute distress  NECK: No adenopathy,masses or thyromegaly  RESP: lungs clear to auscultation   CV: normal rate, regular rhythm, no murmur or gallop  ABDOMEN: soft, no organomegaly, masses or tenderness  LYMPHATICS: No cervical, supraclavicular or inguinal adenopathy  MS: extremities normal, no peripheral edema  MS: moderate kyphosis noted  NEURO:  Alert, oriented, speech and mentation normal  PSYCH: mentation appears normal, affect and mood normal   Labs Resulted Today:   Results for orders placed or performed in visit on 04/25/19   CBC with Diff/Plt ([facility])   Result Value Ref Range    WBC x10/cmm 3.7 (A) 3.8 - 11.0 x10/cmm    % Lymphocytes 20.6 20.5 - 51.1 %    % Monocytes 5.1 1.7 - 9.3 %    % Granulocytes 74.3 42.2 - 75.2 %    RBC x10/cmm 4.28 3.7 - 5.2 x10/cmm    Hemoglobin 12.9 11.8 - 15.5 g/dl    Hematocrit 38.9 35 - 46 %    MCV 90.8 80 - 100 fL    MCH 30.3 27.0 - 31.0 pg    MCHC 33.4 33.0 - 37.0 g/dL    Platelet Count 249 140 - 450 K/uL   Vitamin B12 ([facility])   Result Value Ref Range    Vitamin B12 770 232 - 1,245 pg/mL    Narrative    Performed at:  01 - [facility] [address], [place], [place]  [id number]  : Leonides Lo MD, Phone:  [phone]     ASSESSMENT/PLAN:                                                        Zo was seen today for follow up and other.    Diagnoses and all orders for this visit:    History of non anemic vitamin B12 deficiency  -     Vitamin B12 ([facility])  Verify whether or not she needs to restart supplement.    Spinal stenosis of lumbar region with neurogenic claudication  -     traMADol (ULTRAM) 50 MG tablet; Take 1-2 tablets ( mg) by mouth every 6 hours as needed for moderate pain  Continue careful use of tramadol for pain relief.    History of anemia  -     CBC with Diff/Plt ([facility])  Recheck CBC to make sure no evidence of return of gastritis.      There are no Patient Instructions on file for this visit.    The following health maintenance items are reviewed in Epic and correct as of today:  Health Maintenance   Topic Date Due     DTAP/TDAP/TD IMMUNIZATION (1 - Tdap) 12/12/1966     ZOSTER IMMUNIZATION (1 of 2) 12/12/1991     ADVANCE DIRECTIVE PLANNING Q5 YRS  12/12/1996     MEDICARE ANNUAL WELLNESS VISIT  12/12/2006     PNEUMOCOCCAL IMMUNIZATION 65+ LOW/MEDIUM RISK (1 of 2 - PCV13) 12/12/2006      PHQ-2  01/01/2019     FALL RISK ASSESSMENT  05/03/2019     LIPID SCREEN Q5 YR FEMALE (SYSTEM ASSIGNED)  01/12/2021     FIT-DNA (Cologuard) Q3 YR  10/08/2021     COLONOSCOPY Q10 YR  10/30/2028     DEXA SCAN SCREENING (SYSTEM ASSIGNED)  Completed     INFLUENZA VACCINE  Addressed     IPV IMMUNIZATION  Aged Out     MENINGITIS IMMUNIZATION  Aged Out       Allison Maxwell MD  [facility]  Family Practice  [facility]  [phone]    For any issues my office # is [phone]

## 2019-04-26 LAB — VIT B12 SERPL-MCNC: 770 PG/ML (ref 232–1245)

## 2019-06-04 ENCOUNTER — TELEPHONE (OUTPATIENT)
Dept: FAMILY MEDICINE | Facility: CLINIC | Age: 79
End: 2019-06-04

## 2019-06-04 DIAGNOSIS — M48.00 SPINAL STENOSIS: Primary | ICD-10-CM

## 2019-06-04 DIAGNOSIS — M41.9 SCOLIOSIS: ICD-10-CM

## 2019-06-04 NOTE — TELEPHONE ENCOUNTER
Call from patient that she would like referral to [facility].  She was referred there from her son in law who goes there.  She is on a small amount of pain medication and would like to get off of it.  Per Dr Hans MARTINEZ for referral.  Referral sent to [facility].  They will call patient to schedule consult.

## 2019-09-26 ENCOUNTER — OFFICE VISIT (OUTPATIENT)
Dept: FAMILY MEDICINE | Facility: CLINIC | Age: 79
End: 2019-09-26

## 2019-09-26 VITALS
OXYGEN SATURATION: 95 % | BODY MASS INDEX: 19.76 KG/M2 | SYSTOLIC BLOOD PRESSURE: 146 MMHG | HEART RATE: 80 BPM | DIASTOLIC BLOOD PRESSURE: 86 MMHG | RESPIRATION RATE: 16 BRPM | WEIGHT: 116 LBS

## 2019-09-26 DIAGNOSIS — M48.062 SPINAL STENOSIS OF LUMBAR REGION WITH NEUROGENIC CLAUDICATION: ICD-10-CM

## 2019-09-26 PROCEDURE — 99213 OFFICE O/P EST LOW 20 MIN: CPT | Performed by: FAMILY MEDICINE

## 2019-09-26 RX ORDER — TRAMADOL HYDROCHLORIDE 50 MG/1
50-100 TABLET ORAL EVERY 6 HOURS PRN
Qty: 60 TABLET | Refills: 1 | Status: SHIPPED | OUTPATIENT
Start: 2019-09-26 | End: 2020-01-01

## 2019-09-26 NOTE — PROGRESS NOTES
Problem(s) Oriented visit        SUBJECTIVE:                                                    Zo Chan is a 78 year old female who presents to clinic today for renewal of pain medication. She has spinal stenosis and scoliosis/kyphosis. She takes 1/2 tramadol with tylenol once daily.    She had been to the pain clinic and was given Rx for gabapentin. She has not taken it because of the potential side effect profile. She wants my opinion on whether or not she should try it.     She requests [employer] parking for winter when she is a fall risk.       Problem list, Medication list, Allergies, and Medical/Social/Surgical histories reviewed in [facility] and updated as appropriate.   Additional history: as documented    ROS:  5 point ROS completed and negative except noted above, including Gen, CV, Resp, GI, MS      Histories:   Patient Active Problem List   Diagnosis     Idiopathic scoliosis     Anemia     Iron deficiency anemia due to chronic blood loss     Gastrointestinal hemorrhage associated with gastric ulcer     Spinal stenosis of lumbar region     Vitamin D deficiency     No past surgical history on file.    Social History     Tobacco Use     Smoking status: Never Smoker     Smokeless tobacco: Never Used   Substance Use Topics     Alcohol use: No     No family history on file.        OBJECTIVE:                                                    BP (!) 146/86   Pulse 80   Resp 16   Wt 52.6 kg (116 lb)   SpO2 95%   BMI 19.76 kg/m    Body mass index is 19.76 kg/m .   GENERAL APPEARANCE: Alert, no acute distress  NEURO: Alert, oriented, speech and mentation normal  PSYCH: mentation appears normal, affect and mood normal   Labs Resulted Today:   Results for orders placed or performed in visit on 04/25/19   CBC with Diff/Plt ([facility])   Result Value Ref Range    WBC x10/cmm 3.7 (A) 3.8 - 11.0 x10/cmm    % Lymphocytes 20.6 20.5 - 51.1 %    % Monocytes 5.1 1.7 - 9.3 %    % Granulocytes 74.3 42.2 - 75.2 %    RBC  x10/cmm 4.28 3.7 - 5.2 x10/cmm    Hemoglobin 12.9 11.8 - 15.5 g/dl    Hematocrit 38.9 35 - 46 %    MCV 90.8 80 - 100 fL    MCH 30.3 27.0 - 31.0 pg    MCHC 33.4 33.0 - 37.0 g/dL    Platelet Count 249 140 - 450 K/uL   Vitamin B12 ([facility])   Result Value Ref Range    Vitamin B12 770 232 - 1,245 pg/mL    Narrative    Performed at:  01 - [facility] [address], [place], [place]  [id number]  : Leonides Lo MD, Phone:  [phone]     ASSESSMENT/PLAN:                                                        Zo was seen today for pain.    Diagnoses and all orders for this visit:    Spinal stenosis of lumbar region with neurogenic claudication  -     naloxone (NARCAN) 4 MG/0.1ML nasal spray; Spray 1 spray (4 mg) into one nostril alternating nostrils once as needed for opioid reversal Every 2-3 minutes until patient responsive or EMS arrives  -     traMADol (ULTRAM) 50 MG tablet; Take 1-2 tablets ( mg) by mouth every 6 hours as needed for moderate pain  We discussed gabapentin and its pros and cons. She is encouraged to try a starting dose, then gradually increase over a few weeks as tolerated.     >15 min spent with patient, greater than 50% spent on discussion/education/planning, etc about The encounter diagnosis was Spinal stenosis of lumbar region with neurogenic claudication.         There are no Patient Instructions on file for this visit.    The following health maintenance items are reviewed in Epic and correct as of today:  Health Maintenance   Topic Date Due     ADVANCE CARE PLANNING  1940     DTAP/TDAP/TD IMMUNIZATION (1 - Tdap) 12/12/1966     ZOSTER IMMUNIZATION (1 of 2) 12/12/1991     MEDICARE ANNUAL WELLNESS VISIT  12/12/2006     PNEUMOCOCCAL IMMUNIZATION 65+ LOW/MEDIUM RISK (1 of 2 - PCV13) 12/12/2006     PHQ-2  01/01/2019     FALL RISK ASSESSMENT  05/03/2019     INFLUENZA VACCINE (1) 09/01/2019     LIPID  01/12/2021     FIT-DNA (Cologuard)  10/08/2021     DEXA  Completed      IPV IMMUNIZATION  Aged Out     MENINGITIS IMMUNIZATION  Aged Out       Allison Maxwell MD  [facility]  [phone]    For any issues my office # is [phone]

## 2019-10-01 ENCOUNTER — HEALTH MAINTENANCE LETTER (OUTPATIENT)
Age: 79
End: 2019-10-01

## 2019-12-15 ENCOUNTER — HEALTH MAINTENANCE LETTER (OUTPATIENT)
Age: 79
End: 2019-12-15

## 2020-01-01 ENCOUNTER — VIRTUAL VISIT (OUTPATIENT)
Dept: FAMILY MEDICINE | Facility: CLINIC | Age: 80
End: 2020-01-01

## 2020-01-01 ENCOUNTER — OFFICE VISIT (OUTPATIENT)
Dept: FAMILY MEDICINE | Facility: CLINIC | Age: 80
End: 2020-01-01

## 2020-01-01 VITALS
TEMPERATURE: 98.4 F | DIASTOLIC BLOOD PRESSURE: 94 MMHG | RESPIRATION RATE: 16 BRPM | HEART RATE: 86 BPM | SYSTOLIC BLOOD PRESSURE: 154 MMHG | OXYGEN SATURATION: 98 %

## 2020-01-01 VITALS — BODY MASS INDEX: 19.76 KG/M2 | WEIGHT: 116 LBS

## 2020-01-01 DIAGNOSIS — M48.062 SPINAL STENOSIS OF LUMBAR REGION WITH NEUROGENIC CLAUDICATION: ICD-10-CM

## 2020-01-01 DIAGNOSIS — Z87.11 HISTORY OF GASTRIC ULCER: ICD-10-CM

## 2020-01-01 DIAGNOSIS — R10.13 DYSPEPSIA: ICD-10-CM

## 2020-01-01 DIAGNOSIS — M25.562 ACUTE PAIN OF LEFT KNEE: Primary | ICD-10-CM

## 2020-01-01 DIAGNOSIS — K25.4 GASTROINTESTINAL HEMORRHAGE ASSOCIATED WITH GASTRIC ULCER: Primary | ICD-10-CM

## 2020-01-01 DIAGNOSIS — J01.11 ACUTE RECURRENT FRONTAL SINUSITIS: ICD-10-CM

## 2020-01-01 PROCEDURE — 99213 OFFICE O/P EST LOW 20 MIN: CPT | Performed by: FAMILY MEDICINE

## 2020-01-01 PROCEDURE — 73560 X-RAY EXAM OF KNEE 1 OR 2: CPT | Mod: FY | Performed by: FAMILY MEDICINE

## 2020-01-01 PROCEDURE — 99214 OFFICE O/P EST MOD 30 MIN: CPT | Mod: GT | Performed by: FAMILY MEDICINE

## 2020-01-01 RX ORDER — NICOTINE POLACRILEX 4 MG/1
GUM, CHEWING ORAL
Qty: 90 TABLET | Refills: 1 | Status: SHIPPED | OUTPATIENT
Start: 2020-01-01 | End: 2021-01-01

## 2020-01-01 RX ORDER — TRAMADOL HYDROCHLORIDE 50 MG/1
50-100 TABLET ORAL EVERY 6 HOURS PRN
Qty: 60 TABLET | Refills: 1 | Status: SHIPPED | OUTPATIENT
Start: 2020-01-01 | End: 2020-01-01

## 2020-01-01 RX ORDER — AZITHROMYCIN 250 MG/1
TABLET, FILM COATED ORAL
Qty: 6 TABLET | Refills: 0 | Status: SHIPPED | OUTPATIENT
Start: 2020-01-01 | End: 2020-01-01

## 2020-01-01 RX ORDER — TRAMADOL HYDROCHLORIDE 50 MG/1
50-100 TABLET ORAL EVERY 6 HOURS PRN
Qty: 60 TABLET | Refills: 1 | Status: SHIPPED | OUTPATIENT
Start: 2020-01-01 | End: 2021-01-01

## 2020-01-14 DIAGNOSIS — R10.13 DYSPEPSIA: ICD-10-CM

## 2020-01-14 DIAGNOSIS — Z87.11 HISTORY OF GASTRIC ULCER: ICD-10-CM

## 2020-01-14 RX ORDER — NICOTINE POLACRILEX 4 MG/1
GUM, CHEWING ORAL
Qty: 90 TABLET | Refills: 1 | Status: SHIPPED | OUTPATIENT
Start: 2020-01-14 | End: 2020-01-01

## 2020-05-04 NOTE — PROGRESS NOTES
"  Problem(s) Oriented visit      Zo Chan is being evaluated via a billable video visit.      The patient has been notified of following:     \"This video visit will be conducted via a call between you and your physician/provider. We have found that certain health care needs can be provided without the need for an in-person physical exam.  This service lets us provide the care you need with a video conversation.  If a prescription is necessary we can send it directly to your pharmacy.  If lab work is needed we can place an order for that and you can then stop by our lab to have the test done at a later time.    If during the course of the call the physician/provider feels a video visit is not appropriate, you will not be charged for this service.\"     Physician has received verbal consent for a Video Visit from the patient? Yes  9:15 AM  SUBJECTIVE:                                                    Subjective     Zo Chan is a 79 year old female who presents to clinic today for the following health issues:    HPI     1. Spinal stenosis of lumbar region with neurogenic claudication  Back pain continues, walking with a cart, if needs to walk very far.  Had PT, not much help, Did PT with [facility] .  Has not been to [facility]    2. Sinusitis.    Facial pain/pressure.  Symptoms had a gradual onset  6 days ago .  The symptoms are moderate  in severity.  Other Current and Associated symptoms: facial pain/pressure.        Histories:   Patient Active Problem List   Diagnosis     Idiopathic scoliosis     Anemia     Iron deficiency anemia due to chronic blood loss     Gastrointestinal hemorrhage associated with gastric ulcer     Spinal stenosis of lumbar region     Vitamin D deficiency     No past surgical history on file.    Social History     Tobacco Use     Smoking status: Never Smoker     Smokeless tobacco: Never Used   Substance Use Topics     Alcohol use: No     No family history on file.  " "      Reviewed and updated as needed this visit by Provider     General and Resp. completed and negative except as noted above    ROS:            OBJECTIVE:                                                      Objective    There were no vitals taken for this visit.  Estimated body mass index is 19.76 kg/m  as calculated from the following:    Height as of 5/3/18: 1.632 m (5' 4.25\").    Weight as of 9/26/19: 52.6 kg (116 lb).    Physical Exam   APPEARANCE: = Relaxed and in no distress  Recent/Remote Memory = Alert and Oriented x 3  Mood/Affect = Cooperative and interested            ASSESSMENT/PLAN:                                                        There are no Patient Instructions on file for this visit.  Zo was seen today for sinus problem and refill request.    Diagnoses and all orders for this visit:    Spinal stenosis of lumbar region with neurogenic claudication        Will need a [facility] visit when able due to COVID    Video-Visit Details    Type of service:  Video Visit  Originating Location (pt. Location): Home  Distant Location (provider location):  [facility]   Mode of Communication:  Video Conference via we were unable to get video to work so had to do visit just with audio      The following health maintenance items are reviewed in Epic and correct as of today:  Health Maintenance   Topic Date Due     ADVANCE CARE PLANNING  1940     DTAP/TDAP/TD IMMUNIZATION (1 - Tdap) 12/12/1966     ZOSTER IMMUNIZATION (1 of 2) 12/12/1991     MEDICARE ANNUAL WELLNESS VISIT  12/12/2006     PNEUMOCOCCAL IMMUNIZATION 65+ LOW/MEDIUM RISK (1 of 2 - PCV13) 12/12/2006     FALL RISK ASSESSMENT  05/03/2019     PHQ-2  01/01/2020     INFLUENZA VACCINE (Season Ended) 09/01/2020     LIPID  01/12/2021     COLORECTAL CANCER SCREENING  10/08/2021     DEXA  Completed     IPV IMMUNIZATION  Aged Out     MENINGITIS IMMUNIZATION  Aged Out       Oneal Salter MD  [facility]  Family " Practice  [facility]  [phone]    For any issues my office # is [phone]

## 2020-10-08 NOTE — PROGRESS NOTES
"  Problem(s) Oriented visit      Zo Chan is being evaluated via a billable video visit.      The patient has been notified of following:     \"This video visit will be conducted via a call between you and your physician/provider. We have found that certain health care needs can be provided without the need for an in-person physical exam.  This service lets us provide the care you need with a video conversation.  If a prescription is necessary we can send it directly to your pharmacy.  If lab work is needed we can place an order for that and you can then stop by our lab to have the test done at a later time.    If during the course of the call the physician/provider feels a video visit is not appropriate, you will not be charged for this service.\"     Physician has received verbal consent for a Video Visit from the patient? Yes  1:00 PM  SUBJECTIVE:                                                    Subjective     Zo Chan is a 79 year old female who presents to clinic today for the following health issues:    HPI     1. Spinal stenosis of lumbar region with neurogenic claudication  Back pain continues, walking with a cart, if needs to walk very far.  Had PT, not much help, Did PT with [facility] .  Has not been to [facility]  Takes a 1/2 of tramadol and that has helped  Son in law is on medical MJ.    2. GERD stable.  Taking meds as ordered, no reported side effects from medicines.  Eating properly to avoid sx.   No melena, no hematochezia, no diarrhea.  No unintended weigth loss.    3. Constipation -    Onset: a number year(s) ago    Description:   Frequency of bowel movements: happens lots of days when can't go    Accompanying Signs & Symptoms:   Abdominal pain: no  Blood in stool: no  Rectal pain: no  Nausea/vomiting: no  Fevers: no  Weight loss or gain: no    Precipitating and/or Alleviating factors:    Exercising daily: yes  Recent use of Narcotics, anticholinergics, calcium channel blockers, " "antacids, or iron supplements: YES- and that is the main issue   Any previous tests: colonoscopy   History of abdominal surgery: no  Chronic Laxative Use no    Therapies tried and outcome: laxitive with  minor relief        Histories:   Patient Active Problem List   Diagnosis     Idiopathic scoliosis     Anemia     Iron deficiency anemia due to chronic blood loss     Gastrointestinal hemorrhage associated with gastric ulcer     Spinal stenosis of lumbar region     Vitamin D deficiency     No past surgical history on file.    Social History     Tobacco Use     Smoking status: Never Smoker     Smokeless tobacco: Never Used   Substance Use Topics     Alcohol use: No     No family history on file.        Reviewed and updated as needed this visit by Provider             General and Resp. completed and negative except as noted above    ROS:            OBJECTIVE:                                                      Objective    There were no vitals taken for this visit.  Estimated body mass index is 19.76 kg/m  as calculated from the following:    Height as of 5/3/18: 1.632 m (5' 4.25\").    Weight as of 5/4/20: 52.6 kg (116 lb).    Physical Exam   APPEARANCE: = Relaxed and in no distress  Recent/Remote Memory = Alert and Oriented x 3  Mood/Affect = Cooperative and interested            ASSESSMENT/PLAN:                                                        There are no Patient Instructions on file for this visit.  Zo was seen today for recheck medication.    Diagnoses and all orders for this visit:    Spinal stenosis of lumbar region with neurogenic claudication  Explore Medical MJ to help with   -     traMADol (ULTRAM) 50 MG tablet; Take 1-2 tablets ( mg) by mouth every 6 hours as needed for moderate pain    Dyspepsia  Discussed the functional nature of this condition regarding what they eat, how they eat, when they eat, and how much they eat as all contributing to gastroesophageal symptoms.    Recommend " anti-reflux diet and eating patterns emphasizing smaller more frequent meals and timing relative to bedtime.  Also recommend avoiding tomatoes, onions, spicy foods, caffeine, or any other food/beverage that cause increased reflux.    If symptoms not controlled on current therapies, then need to return for further evaluation and consideration of further studies.    -     omeprazole 20 MG tablet; 1 capsule by mouth daily    History of gastric ulcer  -     omeprazole 20 MG tablet; 1 capsule by mouth daily        Video-Visit Details    Type of service:  Video Visit  Originating Location (pt. Location): Home  Distant Location (provider location):  [facility]   Mode of Communication:  Video Conference via we were unable to get video to work so had to do visit just with audio      The following health maintenance items are reviewed in Epic and correct as of today:  Health Maintenance   Topic Date Due     ADVANCE CARE PLANNING  1940     DTAP/TDAP/TD IMMUNIZATION (1 - Tdap) 12/12/1966     ZOSTER IMMUNIZATION (1 of 2) 12/12/1991     MEDICARE ANNUAL WELLNESS VISIT  12/12/2006     Pneumococcal Vaccine: 65+ Years (1 of 1 - PPSV23) 12/12/2006     INFLUENZA VACCINE (1) 09/01/2020     LIPID  01/12/2021     FALL RISK ASSESSMENT  05/04/2021     COLORECTAL CANCER SCREENING  10/08/2021     DEXA  Completed     PHQ-2  Completed     Pneumococcal Vaccine: Pediatrics (0 to 5 Years) and At-Risk Patients (6 to 64 Years)  Aged Out     IPV IMMUNIZATION  Aged Out     MENINGITIS IMMUNIZATION  Aged Out     HEPATITIS B IMMUNIZATION  Aged Out       Oneal Salter MD  [facility]  Family Practice  [facility]  [phone]    For any issues my office # is [phone]

## 2020-11-20 NOTE — PATIENT INSTRUCTIONS
Rest the affected painful area as much as possible.  Apply ice for 15-20 minutes intermittently as needed and especially after any offending activity. Daily stretching.  As pain recedes, begin normal activities slowly as tolerated.  Consider Physical Therapy if symptoms not better with symptomatic care.

## 2020-11-20 NOTE — PROGRESS NOTES
SUBJECTIVE:  Chief Complaint   Patient presents with     Knee Pain     left knee/leg swelling and pain - no injury. very much pain today     Zo Chan is a 79 year old female who presents with a chief complaint of left knee pain, swelling and tenderness.  Symptoms began 2 week(s) ago, are moderate and gradual onset and worsening  Context:  Injury:No.  Injury happened while at home. How: walking with a new upright arm resting walker due to her scoliosis allowing her to overuse.   Pain exacerbated by walking, weight-bearing, movement, twisting and standing Relieved by rest.  She treated it initially with no therapy. This is the first time this type of injury has occurred to this patient.     Past Medical History:   Diagnosis Date     NO ACTIVE PROBLEMS (aka NONE)      Current Outpatient Medications   Medication Sig Dispense Refill     Cholecalciferol (VITAMIN D) 2000 UNITS tablet Take 2 tabs po qd. 100 tablet 3     Loratadine-Pseudoephedrine (CLARITIN-D 12 HOUR PO) Take 1 tablet by mouth as needed       omeprazole 20 MG tablet 1 capsule by mouth daily 90 tablet 1     traMADol (ULTRAM) 50 MG tablet Take 1-2 tablets ( mg) by mouth every 6 hours as needed for moderate pain 60 tablet 1     triamcinolone (KENALOG) 0.1 % ointment Apply sparingly to affected area BID 80 g 1     naloxone (NARCAN) 4 MG/0.1ML nasal spray Spray 1 spray (4 mg) into one nostril alternating nostrils once as needed for opioid reversal Every 2-3 minutes until patient responsive or EMS arrives (Patient not taking: Reported on 10/8/2020) 0.2 mL 0     Social History     Tobacco Use     Smoking status: Never Smoker     Smokeless tobacco: Never Used   Substance Use Topics     Alcohol use: No       ROS:  CONSTITUTIONAL:NEGATIVE for fever, chills, change in weight  MUSCULOSKELETAL: NEGATIVE for significant arthralgias or myalgia  Review of systems negative except as stated below    EXAM:   BP (!) 154/94   Pulse 86   Temp 98.4  F (36.9  C)  (Skin)   Resp 16   SpO2 98%   M/S Exam:ankle and kneeswelling, tenderness to palpation and decreased ROM leftGENERAL APPEARANCE: healthy, alert and no distress  EXTREMITIES: peripheral pulses normal  SKIN: no suspicious lesions or rashes  NEURO: Normal strength and tone, sensory exam grossly normal, mentation intact and speech normal    X-RAY was done.  Shows significant OA of the knee with preserved joint space    ASSESSMENT:     Encounter Diagnosis   Name Primary?     Acute pain of left knee Yes   Overuse of the knee with Soft tissue inflammation    PLAN:  See orders in epic  Rest the affected painful area as much as possible.  Apply ice for 15-20 minutes intermittently as needed and especially after any offending activity. Daily stretching.  As pain recedes, begin normal activities slowly as tolerated.  Consider Physical Therapy if symptoms not better with symptomatic care.

## 2021-01-01 ENCOUNTER — APPOINTMENT (OUTPATIENT)
Dept: CT IMAGING | Facility: CLINIC | Age: 81
End: 2021-01-01
Attending: EMERGENCY MEDICINE
Payer: COMMERCIAL

## 2021-01-01 ENCOUNTER — HEALTH MAINTENANCE LETTER (OUTPATIENT)
Age: 81
End: 2021-01-01

## 2021-01-01 ENCOUNTER — APPOINTMENT (OUTPATIENT)
Dept: GENERAL RADIOLOGY | Facility: CLINIC | Age: 81
End: 2021-01-01
Attending: EMERGENCY MEDICINE
Payer: COMMERCIAL

## 2021-01-01 ENCOUNTER — HOSPITAL ENCOUNTER (INPATIENT)
Facility: CLINIC | Age: 81
LOS: 1 days | DRG: 246 | End: 2021-04-16
Admitting: INTERNAL MEDICINE
Payer: COMMERCIAL

## 2021-01-01 ENCOUNTER — VIRTUAL VISIT (OUTPATIENT)
Dept: FAMILY MEDICINE | Facility: CLINIC | Age: 81
End: 2021-01-01

## 2021-01-01 ENCOUNTER — HOSPITAL ENCOUNTER (EMERGENCY)
Facility: CLINIC | Age: 81
Discharge: ANOTHER HOSPITAL | End: 2021-04-16
Attending: EMERGENCY MEDICINE | Admitting: EMERGENCY MEDICINE
Payer: COMMERCIAL

## 2021-01-01 VITALS
HEART RATE: 106 BPM | DIASTOLIC BLOOD PRESSURE: 74 MMHG | WEIGHT: 115.96 LBS | OXYGEN SATURATION: 96 % | BODY MASS INDEX: 19.75 KG/M2 | RESPIRATION RATE: 25 BRPM | SYSTOLIC BLOOD PRESSURE: 101 MMHG | TEMPERATURE: 97.5 F

## 2021-01-01 DIAGNOSIS — I21.3 ST ELEVATION MI (STEMI) (H): ICD-10-CM

## 2021-01-01 DIAGNOSIS — M62.81 GENERALIZED MUSCLE WEAKNESS: Primary | ICD-10-CM

## 2021-01-01 DIAGNOSIS — R79.89 ELEVATED TROPONIN: ICD-10-CM

## 2021-01-01 DIAGNOSIS — Z87.11 HISTORY OF GASTRIC ULCER: ICD-10-CM

## 2021-01-01 DIAGNOSIS — A41.9 SEPTIC SHOCK (H): ICD-10-CM

## 2021-01-01 DIAGNOSIS — M48.061 SPINAL STENOSIS OF LUMBAR REGION, UNSPECIFIED WHETHER NEUROGENIC CLAUDICATION PRESENT: ICD-10-CM

## 2021-01-01 DIAGNOSIS — R52 BODY ACHES: ICD-10-CM

## 2021-01-01 DIAGNOSIS — U07.1 2019 NOVEL CORONAVIRUS DISEASE (COVID-19): ICD-10-CM

## 2021-01-01 DIAGNOSIS — R57.0 CARDIOGENIC SHOCK (H): Primary | ICD-10-CM

## 2021-01-01 DIAGNOSIS — I21.3 ST ELEVATION MYOCARDIAL INFARCTION (STEMI), UNSPECIFIED ARTERY (H): ICD-10-CM

## 2021-01-01 DIAGNOSIS — R09.02 HYPOXIA: ICD-10-CM

## 2021-01-01 DIAGNOSIS — I95.9 HYPOTENSION, UNSPECIFIED HYPOTENSION TYPE: ICD-10-CM

## 2021-01-01 DIAGNOSIS — R06.02 SHORTNESS OF BREATH: ICD-10-CM

## 2021-01-01 DIAGNOSIS — R10.13 DYSPEPSIA: ICD-10-CM

## 2021-01-01 DIAGNOSIS — M48.062 SPINAL STENOSIS OF LUMBAR REGION WITH NEUROGENIC CLAUDICATION: ICD-10-CM

## 2021-01-01 DIAGNOSIS — R65.21 SEPTIC SHOCK (H): ICD-10-CM

## 2021-01-01 LAB
ALBUMIN SERPL-MCNC: 2.9 G/DL (ref 3.4–5)
ALBUMIN UR-MCNC: 50 MG/DL
ALP SERPL-CCNC: 110 U/L (ref 40–150)
ALT SERPL W P-5'-P-CCNC: 50 U/L (ref 0–50)
ANION GAP SERPL CALCULATED.3IONS-SCNC: 21 MMOL/L (ref 3–14)
ANISOCYTOSIS BLD QL SMEAR: ABNORMAL
APPEARANCE UR: ABNORMAL
AST SERPL W P-5'-P-CCNC: 256 U/L (ref 0–45)
BASE DEFICIT BLDA-SCNC: 22.3 MMOL/L
BASE DEFICIT BLDV-SCNC: 19.7 MMOL/L
BASOPHILS # BLD AUTO: 0 10E9/L (ref 0–0.2)
BASOPHILS NFR BLD AUTO: 0 %
BILIRUB SERPL-MCNC: 0.6 MG/DL (ref 0.2–1.3)
BILIRUB UR QL STRIP: NEGATIVE
BUN SERPL-MCNC: 17 MG/DL (ref 7–30)
CALCIUM SERPL-MCNC: 8.3 MG/DL (ref 8.5–10.1)
CHLORIDE SERPL-SCNC: 100 MMOL/L (ref 94–109)
CO2 BLDCOV-SCNC: 12 MMOL/L (ref 21–28)
CO2 SERPL-SCNC: 13 MMOL/L (ref 20–32)
COLOR UR AUTO: YELLOW
CREAT BLD-MCNC: 1.4 MG/DL (ref 0.52–1.04)
CREAT SERPL-MCNC: 1.27 MG/DL (ref 0.52–1.04)
DIFFERENTIAL METHOD BLD: ABNORMAL
EOSINOPHIL # BLD AUTO: 0 10E9/L (ref 0–0.7)
EOSINOPHIL NFR BLD AUTO: 0 %
ERYTHROCYTE [DISTWIDTH] IN BLOOD BY AUTOMATED COUNT: 20.4 % (ref 10–15)
FLUAV RNA RESP QL NAA+PROBE: NEGATIVE
FLUBV RNA RESP QL NAA+PROBE: NEGATIVE
GFR SERPL CREATININE-BSD FRML MDRD: 36 ML/MIN/{1.73_M2}
GFR SERPL CREATININE-BSD FRML MDRD: 40 ML/MIN/{1.73_M2}
GLUCOSE BLDC GLUCOMTR-MCNC: 304 MG/DL (ref 70–99)
GLUCOSE SERPL-MCNC: 315 MG/DL (ref 70–99)
GLUCOSE UR STRIP-MCNC: NEGATIVE MG/DL
HCO3 BLD-SCNC: 12 MMOL/L (ref 21–28)
HCO3 BLD-SCNC: 9 MMOL/L (ref 21–28)
HCO3 BLDV-SCNC: 12 MMOL/L (ref 21–28)
HCT VFR BLD AUTO: 36.4 % (ref 35–47)
HGB BLD-MCNC: 10.7 G/DL (ref 11.7–15.7)
HGB UR QL STRIP: NEGATIVE
HYALINE CASTS #/AREA URNS LPF: 152 /LPF (ref 0–2)
KCT BLD-ACNC: 262 SEC (ref 75–150)
KETONES UR STRIP-MCNC: ABNORMAL MG/DL
LABORATORY COMMENT REPORT: ABNORMAL
LACTATE BLD-SCNC: 12.7 MMOL/L (ref 0.7–2.1)
LACTATE BLD-SCNC: 13.1 MMOL/L (ref 0.7–2)
LEUKOCYTE ESTERASE UR QL STRIP: NEGATIVE
LYMPHOCYTES # BLD AUTO: 0.8 10E9/L (ref 0.8–5.3)
LYMPHOCYTES NFR BLD AUTO: 15 %
MACROCYTES BLD QL SMEAR: PRESENT
MCH RBC QN AUTO: 24.9 PG (ref 26.5–33)
MCHC RBC AUTO-ENTMCNC: 29.4 G/DL (ref 31.5–36.5)
MCV RBC AUTO: 85 FL (ref 78–100)
MICROCYTES BLD QL SMEAR: PRESENT
MONOCYTES # BLD AUTO: 0.2 10E9/L (ref 0–1.3)
MONOCYTES NFR BLD AUTO: 3 %
MUCOUS THREADS #/AREA URNS LPF: PRESENT /LPF
NEUTROPHILS # BLD AUTO: 4.1 10E9/L (ref 1.6–8.3)
NEUTROPHILS NFR BLD AUTO: 82 %
NITRATE UR QL: NEGATIVE
NT-PROBNP SERPL-MCNC: 5965 PG/ML (ref 0–1800)
O2/TOTAL GAS SETTING VFR VENT: 100 %
OVALOCYTES BLD QL SMEAR: SLIGHT
OXYHGB MFR BLD: 35 % (ref 92–100)
OXYHGB MFR BLD: 94 % (ref 92–100)
PCO2 BLD: 43 MM HG (ref 35–45)
PCO2 BLD: 62 MM HG (ref 35–45)
PCO2 BLDV: 47 MM HG (ref 40–50)
PCO2 BLDV: 52 MM HG (ref 40–50)
PH BLD: 6.89 PH (ref 7.35–7.45)
PH BLD: 6.94 PH (ref 7.35–7.45)
PH BLDV: 6.96 PH (ref 7.32–7.43)
PH BLDV: 7.02 PH (ref 7.32–7.43)
PH UR STRIP: 5.5 PH (ref 5–7)
PLATELET # BLD AUTO: 377 10E9/L (ref 150–450)
PLATELET # BLD EST: ABNORMAL 10*3/UL
PO2 BLD: 140 MM HG (ref 80–105)
PO2 BLD: 39 MM HG (ref 80–105)
PO2 BLDV: 27 MM HG (ref 25–47)
PO2 BLDV: 34 MM HG (ref 25–47)
POTASSIUM SERPL-SCNC: 3 MMOL/L (ref 3.4–5.3)
PROCALCITONIN SERPL-MCNC: 0.12 NG/ML
PROT SERPL-MCNC: 7.1 G/DL (ref 6.8–8.8)
RBC # BLD AUTO: 4.29 10E12/L (ref 3.8–5.2)
RBC #/AREA URNS AUTO: 5 /HPF (ref 0–2)
RSV RNA SPEC QL NAA+PROBE: ABNORMAL
SAO2 % BLDV FROM PO2: 28 %
SARS-COV-2 RNA RESP QL NAA+PROBE: POSITIVE
SODIUM SERPL-SCNC: 134 MMOL/L (ref 133–144)
SOURCE: ABNORMAL
SP GR UR STRIP: 1.03 (ref 1–1.03)
SPECIMEN SOURCE: ABNORMAL
TROPONIN I BLD-MCNC: 19.44 UG/L (ref 0–0.08)
TROPONIN I SERPL-MCNC: 51.91 UG/L (ref 0–0.04)
UROBILINOGEN UR STRIP-MCNC: 2 MG/DL (ref 0–2)
WBC # BLD AUTO: 5 10E9/L (ref 4–11)
WBC #/AREA URNS AUTO: 9 /HPF (ref 0–5)

## 2021-01-01 PROCEDURE — 85347 COAGULATION TIME ACTIVATED: CPT

## 2021-01-01 PROCEDURE — 84145 PROCALCITONIN (PCT): CPT | Performed by: EMERGENCY MEDICINE

## 2021-01-01 PROCEDURE — 83880 ASSAY OF NATRIURETIC PEPTIDE: CPT | Performed by: EMERGENCY MEDICINE

## 2021-01-01 PROCEDURE — 027034Z DILATION OF CORONARY ARTERY, ONE ARTERY WITH DRUG-ELUTING INTRALUMINAL DEVICE, PERCUTANEOUS APPROACH: ICD-10-PCS | Performed by: INTERNAL MEDICINE

## 2021-01-01 PROCEDURE — 258N000003 HC RX IP 258 OP 636: Performed by: EMERGENCY MEDICINE

## 2021-01-01 PROCEDURE — 999N000157 HC STATISTIC RCP TIME EA 10 MIN

## 2021-01-01 PROCEDURE — 272N000001 HC OR GENERAL SUPPLY STERILE: Performed by: INTERNAL MEDICINE

## 2021-01-01 PROCEDURE — 250N000011 HC RX IP 250 OP 636: Performed by: INTERNAL MEDICINE

## 2021-01-01 PROCEDURE — 93005 ELECTROCARDIOGRAM TRACING: CPT | Mod: 59

## 2021-01-01 PROCEDURE — 250N000011 HC RX IP 250 OP 636: Performed by: EMERGENCY MEDICINE

## 2021-01-01 PROCEDURE — 82805 BLOOD GASES W/O2 SATURATION: CPT | Performed by: EMERGENCY MEDICINE

## 2021-01-01 PROCEDURE — 71045 X-RAY EXAM CHEST 1 VIEW: CPT

## 2021-01-01 PROCEDURE — 84484 ASSAY OF TROPONIN QUANT: CPT | Mod: 91 | Performed by: EMERGENCY MEDICINE

## 2021-01-01 PROCEDURE — 99285 EMERGENCY DEPT VISIT HI MDM: CPT | Mod: 25

## 2021-01-01 PROCEDURE — 82565 ASSAY OF CREATININE: CPT | Mod: 91

## 2021-01-01 PROCEDURE — 999N000158 HC STATISTIC RCP TIME ED VENT EA 10 MIN

## 2021-01-01 PROCEDURE — 96375 TX/PRO/DX INJ NEW DRUG ADDON: CPT

## 2021-01-01 PROCEDURE — 3E043XZ INTRODUCTION OF VASOPRESSOR INTO CENTRAL VEIN, PERCUTANEOUS APPROACH: ICD-10-PCS | Performed by: INTERNAL MEDICINE

## 2021-01-01 PROCEDURE — 36620 INSERTION CATHETER ARTERY: CPT | Performed by: EMERGENCY MEDICINE

## 2021-01-01 PROCEDURE — 76937 US GUIDE VASCULAR ACCESS: CPT

## 2021-01-01 PROCEDURE — 99291 CRITICAL CARE FIRST HOUR: CPT | Performed by: NURSE PRACTITIONER

## 2021-01-01 PROCEDURE — 85025 COMPLETE CBC W/AUTO DIFF WBC: CPT | Performed by: EMERGENCY MEDICINE

## 2021-01-01 PROCEDURE — 87086 URINE CULTURE/COLONY COUNT: CPT | Performed by: EMERGENCY MEDICINE

## 2021-01-01 PROCEDURE — 99213 OFFICE O/P EST LOW 20 MIN: CPT | Mod: 95 | Performed by: NURSE PRACTITIONER

## 2021-01-01 PROCEDURE — 99152 MOD SED SAME PHYS/QHP 5/>YRS: CPT | Performed by: INTERNAL MEDICINE

## 2021-01-01 PROCEDURE — 250N000009 HC RX 250: Performed by: EMERGENCY MEDICINE

## 2021-01-01 PROCEDURE — C9803 HOPD COVID-19 SPEC COLLECT: HCPCS

## 2021-01-01 PROCEDURE — 93454 CORONARY ARTERY ANGIO S&I: CPT | Mod: XU | Performed by: INTERNAL MEDICINE

## 2021-01-01 PROCEDURE — C9460 INJECTION, CANGRELOR: HCPCS | Performed by: INTERNAL MEDICINE

## 2021-01-01 PROCEDURE — 94002 VENT MGMT INPAT INIT DAY: CPT

## 2021-01-01 PROCEDURE — 31500 INSERT EMERGENCY AIRWAY: CPT

## 2021-01-01 PROCEDURE — 92920 PRQ TRLUML C ANGIOP 1ART&/BR: CPT | Mod: LC

## 2021-01-01 PROCEDURE — C9606 PERC D-E COR REVASC W AMI S: HCPCS | Mod: CA,LD | Performed by: INTERNAL MEDICINE

## 2021-01-01 PROCEDURE — 36556 INSERT NON-TUNNEL CV CATH: CPT

## 2021-01-01 PROCEDURE — 5A0935Z ASSISTANCE WITH RESPIRATORY VENTILATION, LESS THAN 24 CONSECUTIVE HOURS: ICD-10-PCS | Performed by: INTERNAL MEDICINE

## 2021-01-01 PROCEDURE — 99153 MOD SED SAME PHYS/QHP EA: CPT | Performed by: INTERNAL MEDICINE

## 2021-01-01 PROCEDURE — 96365 THER/PROPH/DIAG IV INF INIT: CPT | Mod: 59

## 2021-01-01 PROCEDURE — 83605 ASSAY OF LACTIC ACID: CPT

## 2021-01-01 PROCEDURE — 96366 THER/PROPH/DIAG IV INF ADDON: CPT

## 2021-01-01 PROCEDURE — 82803 BLOOD GASES ANY COMBINATION: CPT | Mod: 91 | Performed by: EMERGENCY MEDICINE

## 2021-01-01 PROCEDURE — 258N000003 HC RX IP 258 OP 636: Performed by: INTERNAL MEDICINE

## 2021-01-01 PROCEDURE — 81001 URINALYSIS AUTO W/SCOPE: CPT | Performed by: EMERGENCY MEDICINE

## 2021-01-01 PROCEDURE — 999N000185 HC STATISTIC TRANSPORT TIME EA 15 MIN

## 2021-01-01 PROCEDURE — 999N000065 XR CHEST PORT 1 VW

## 2021-01-01 PROCEDURE — 250N000013 HC RX MED GY IP 250 OP 250 PS 637: Performed by: EMERGENCY MEDICINE

## 2021-01-01 PROCEDURE — 99291 CRITICAL CARE FIRST HOUR: CPT | Mod: 25 | Performed by: INTERNAL MEDICINE

## 2021-01-01 PROCEDURE — 83605 ASSAY OF LACTIC ACID: CPT | Performed by: EMERGENCY MEDICINE

## 2021-01-01 PROCEDURE — 250N000009 HC RX 250: Performed by: INTERNAL MEDICINE

## 2021-01-01 PROCEDURE — 210N000002 HC R&B HEART CARE

## 2021-01-01 PROCEDURE — 250N000011 HC RX IP 250 OP 636

## 2021-01-01 PROCEDURE — 84484 ASSAY OF TROPONIN QUANT: CPT

## 2021-01-01 PROCEDURE — B2111ZZ FLUOROSCOPY OF MULTIPLE CORONARY ARTERIES USING LOW OSMOLAR CONTRAST: ICD-10-PCS | Performed by: INTERNAL MEDICINE

## 2021-01-01 PROCEDURE — 74177 CT ABD & PELVIS W/CONTRAST: CPT

## 2021-01-01 PROCEDURE — 80053 COMPREHEN METABOLIC PANEL: CPT | Performed by: EMERGENCY MEDICINE

## 2021-01-01 PROCEDURE — 87636 SARSCOV2 & INF A&B AMP PRB: CPT | Performed by: EMERGENCY MEDICINE

## 2021-01-01 PROCEDURE — 999N001017 HC STATISTIC GLUCOSE BY METER IP

## 2021-01-01 PROCEDURE — C1769 GUIDE WIRE: HCPCS | Performed by: INTERNAL MEDICINE

## 2021-01-01 PROCEDURE — C1874 STENT, COATED/COV W/DEL SYS: HCPCS | Performed by: INTERNAL MEDICINE

## 2021-01-01 PROCEDURE — C1894 INTRO/SHEATH, NON-LASER: HCPCS | Performed by: INTERNAL MEDICINE

## 2021-01-01 PROCEDURE — 82803 BLOOD GASES ANY COMBINATION: CPT

## 2021-01-01 PROCEDURE — C1725 CATH, TRANSLUMIN NON-LASER: HCPCS | Performed by: INTERNAL MEDICINE

## 2021-01-01 PROCEDURE — 87040 BLOOD CULTURE FOR BACTERIA: CPT | Performed by: EMERGENCY MEDICINE

## 2021-01-01 DEVICE — STENT SYNERGY DRUG ELUTING 3.00X38MM  H7493926038300: Type: IMPLANTABLE DEVICE | Status: FUNCTIONAL

## 2021-01-01 RX ORDER — ONDANSETRON 2 MG/ML
INJECTION INTRAMUSCULAR; INTRAVENOUS
Status: DISCONTINUED
Start: 2021-01-01 | End: 2021-01-01 | Stop reason: HOSPADM

## 2021-01-01 RX ORDER — ETOMIDATE 2 MG/ML
15 INJECTION INTRAVENOUS ONCE
Status: COMPLETED | OUTPATIENT
Start: 2021-01-01 | End: 2021-01-01

## 2021-01-01 RX ORDER — ONDANSETRON 2 MG/ML
4 INJECTION INTRAMUSCULAR; INTRAVENOUS ONCE
Status: DISCONTINUED | OUTPATIENT
Start: 2021-01-01 | End: 2021-01-01 | Stop reason: HOSPADM

## 2021-01-01 RX ORDER — EPINEPHRINE IN SOD CHLOR,ISO 1 MG/10 ML
SYRINGE (ML) INTRAVENOUS
Status: DISCONTINUED | OUTPATIENT
Start: 2021-01-01 | End: 2021-04-19 | Stop reason: HOSPADM

## 2021-01-01 RX ORDER — TRAMADOL HYDROCHLORIDE 50 MG/1
TABLET ORAL
COMMUNITY
Start: 2021-01-01

## 2021-01-01 RX ORDER — FENTANYL CITRATE-0.9 % NACL/PF 10 MCG/ML
PLASTIC BAG, INJECTION (ML) INTRAVENOUS
Status: DISCONTINUED | OUTPATIENT
Start: 2021-01-01 | End: 2021-04-19 | Stop reason: HOSPADM

## 2021-01-01 RX ORDER — IOPAMIDOL 755 MG/ML
500 INJECTION, SOLUTION INTRAVASCULAR ONCE
Status: COMPLETED | OUTPATIENT
Start: 2021-01-01 | End: 2021-01-01

## 2021-01-01 RX ORDER — PROPOFOL 10 MG/ML
INJECTION, EMULSION INTRAVENOUS
Status: COMPLETED
Start: 2021-01-01 | End: 2021-01-01

## 2021-01-01 RX ORDER — HEPARIN SODIUM 1000 [USP'U]/ML
INJECTION, SOLUTION INTRAVENOUS; SUBCUTANEOUS
Status: DISCONTINUED | OUTPATIENT
Start: 2021-01-01 | End: 2021-04-19 | Stop reason: HOSPADM

## 2021-01-01 RX ORDER — IOPAMIDOL 755 MG/ML
INJECTION, SOLUTION INTRAVASCULAR
Status: DISCONTINUED | OUTPATIENT
Start: 2021-01-01 | End: 2021-04-19 | Stop reason: HOSPADM

## 2021-01-01 RX ORDER — ROCURONIUM BROMIDE 50 MG/5 ML
50 SYRINGE (ML) INTRAVENOUS ONCE
Status: COMPLETED | OUTPATIENT
Start: 2021-01-01 | End: 2021-01-01

## 2021-01-01 RX ORDER — TRAMADOL HYDROCHLORIDE 50 MG/1
50-100 TABLET ORAL EVERY 6 HOURS PRN
Qty: 60 TABLET | Refills: 1 | Status: SHIPPED | OUTPATIENT
Start: 2021-01-01 | End: 2021-01-01

## 2021-01-01 RX ORDER — ASPIRIN 300 MG/1
300 SUPPOSITORY RECTAL ONCE
Status: COMPLETED | OUTPATIENT
Start: 2021-01-01 | End: 2021-01-01

## 2021-01-01 RX ORDER — DEXAMETHASONE SODIUM PHOSPHATE 10 MG/ML
10 INJECTION, SOLUTION INTRAMUSCULAR; INTRAVENOUS ONCE
Status: DISCONTINUED | OUTPATIENT
Start: 2021-01-01 | End: 2021-01-01 | Stop reason: HOSPADM

## 2021-01-01 RX ORDER — NOREPINEPHRINE BITARTRATE 0.02 MG/ML
.03-.125 INJECTION, SOLUTION INTRAVENOUS CONTINUOUS
Status: DISCONTINUED | OUTPATIENT
Start: 2021-01-01 | End: 2021-01-01 | Stop reason: HOSPADM

## 2021-01-01 RX ORDER — HEPARIN SODIUM 10000 [USP'U]/100ML
0-5000 INJECTION, SOLUTION INTRAVENOUS CONTINUOUS
Status: DISCONTINUED | OUTPATIENT
Start: 2021-01-01 | End: 2021-01-01 | Stop reason: HOSPADM

## 2021-01-01 RX ORDER — NOREPINEPHRINE BITARTRATE 0.02 MG/ML
.03-.125 INJECTION, SOLUTION INTRAVENOUS CONTINUOUS
Status: DISCONTINUED | OUTPATIENT
Start: 2021-01-01 | End: 2021-01-01

## 2021-01-01 RX ADMIN — EPINEPHRINE 0.03 MCG/KG/MIN: 1 INJECTION INTRAMUSCULAR; INTRAVENOUS; SUBCUTANEOUS at 17:20

## 2021-01-01 RX ADMIN — NOREPINEPHRINE BITARTRATE 4 MG/250 ML (16 MCG/ML) IN 0.9 % NACL IV 0.03 MCG/KG/MIN: at 16:50

## 2021-01-01 RX ADMIN — VANCOMYCIN HYDROCHLORIDE 1250 MG: 10 INJECTION, POWDER, LYOPHILIZED, FOR SOLUTION INTRAVENOUS at 18:42

## 2021-01-01 RX ADMIN — IOPAMIDOL 51 ML: 755 INJECTION, SOLUTION INTRAVENOUS at 18:07

## 2021-01-01 RX ADMIN — ROCURONIUM BROMIDE 50 MG: 10 INJECTION, SOLUTION INTRAVENOUS at 17:16

## 2021-01-01 RX ADMIN — CANGRELOR 4 MCG/KG/MIN: 50 INJECTION, POWDER, LYOPHILIZED, FOR SOLUTION INTRAVENOUS at 20:49

## 2021-01-01 RX ADMIN — TAZOBACTAM SODIUM AND PIPERACILLIN SODIUM 4.5 G: 500; 4 INJECTION, SOLUTION INTRAVENOUS at 17:20

## 2021-01-01 RX ADMIN — SODIUM CHLORIDE 72 ML: 9 INJECTION, SOLUTION INTRAVENOUS at 18:07

## 2021-01-01 RX ADMIN — ETOMIDATE 15 MG: 20 INJECTION, SOLUTION INTRAVENOUS at 17:17

## 2021-01-01 RX ADMIN — PROPOFOL 15 MCG/KG/MIN: 10 INJECTION, EMULSION INTRAVENOUS at 17:21

## 2021-01-01 RX ADMIN — ASPIRIN 300 MG: 300 SUPPOSITORY RECTAL at 19:18

## 2021-01-01 ASSESSMENT — ENCOUNTER SYMPTOMS
DIARRHEA: 0
COUGH: 1
FEVER: 0
ABDOMINAL PAIN: 1
SHORTNESS OF BREATH: 1
NAUSEA: 1

## 2021-04-16 NOTE — PROGRESS NOTES
Patient transported to CT on mechanical ventilator with HEPA filter in place. Patient tolerated well. RT will continue to monitor.    Minna Jessica, RT

## 2021-04-16 NOTE — Clinical Note
The first balloon was inserted into the left anterior descending.Max pressure = 12 patricia. Total duration = 5 seconds.     Max pressure = 12 patricia. Total duration = 5 seconds.    Balloon reinflated a second time: Max pressure = 12 patricia. Total duration = 5 seconds.

## 2021-04-16 NOTE — Clinical Note
The first balloon was inserted into the left anterior descending.Max pressure = 11 patricia. Total duration = 10 seconds.     Max pressure = 11 patricia. Total duration = 10 seconds.    Balloon reinflated a second time: Max pressure = 11 patricia. Total duration = 10 seconds.

## 2021-04-16 NOTE — PROGRESS NOTES
"Problem(s) Oriented visit      Phone-Visit Details    Type of service:  Phone Visit    Phone Start Time (time phone started): 1042    Phone End Time (time phone stopped): 1051    Originating Location (pt. Location): Home    Distant Location (provider location):  [facility]     Mode of Communication: Phone conference    Physician has received verbal consent for a Phone Visit from the patient? Yes    This was a virtual phone visit conducted during COVID-19 outbreak in regulation with social distancing and quarantine recommendations of the CDC and [place] department of health and human services. A two way audio/video connection was used in real time with patient's consent.    JIMMY Tovar CNP    SUBJECTIVE:                                                    Zo Chan is a 80 year old female who presents to clinic today for the following health issues :     got sick first 2-3 weeks ago, patient got ill 4 days later. Has been slowly getting better - resting a lot. Postnasal drip. Last night around 9 pm her bones started hurting in upper part of body. Thinks that bone pain is due to allergies. Has spinal stenosis. History of constipation her whole life, \"curvy intestines\". Thinks she took too many laxatives - has been going to the bathroom a lot. Took allergy medicine, which she thinks helped with bone pain. Feels \"washed out\". Took Tylenol which helped some. Unable to take NSAIDs due to history of ulcer. Taking 1/2 tramadol per day usually but took whole pill today.   Denies fever. Not having any difficulty breathing. Is drinking and eating okay.   States she got cataracts from Flonase.     Problem list, Medication list, Allergies, and Medical/Social/Surgical histories reviewed in [facility] and updated as appropriate.   Additional history: as documented    ROS:  7 point ROS completed and negative except noted above, including Gen, HEENT, CV, Resp, GI, MS    OBJECTIVE:                           "                          No vitals taken due to telehealth visit    General: Elderly female sounds acutely ill  Resp: Speaking in full sentences, no cough  Psych: Normal mood     ASSESSMENT/PLAN:                                                      Zo was seen today for abdominal pain, musculoskeletal problem and constipation.    Diagnoses and all orders for this visit:    Generalized muscle weakness  Body aches  Spinal stenosis of lumbar region, unspecified whether neurogenic claudication present  Unknown cause of body aches although patient seems to think combination of spinal stenosis, scoliosis and allergies. Has taken Claritin-D last couple days which is helping some.   Recommend Claritin (without Sudafed due to high blood pressure), Tylenol as needed, rest, fluids, and decreasing laxative use to help with diarrhea.      See Patient Instructions  Patient Instructions   Recommend daily antihistamine such as Claritin to help with allergy symptoms. Try to not take Sudafed since this can raise your blood pressure.    Try to ease up on laxative use over the weekend to see if this helps    Follow-up next week if no improvement, sooner if worsening      JIMMY Tovar CNP  [facility]  Family Practice  [facility]  [phone]    For any issues my office # is [phone]

## 2021-04-16 NOTE — ED PROVIDER NOTES
History   Chief Complaint:  Shortness of Breath     HPI   Zo Chan is a 80 year old female with history of iron deficiency anemia and GI hemorrhage who presents acute onset of shortness of breath since last night, prompting her  to call EMS today. Per EMS report, pulse ox dropped below the 80s on room air and the patient was placed on a non re breather mask. Laying on her back does not worsen her shortness of breath. Upon arrival, the patient endorses abdominal pain and nausea as well. Also reports mild cough. She denies any fevers, chest pain, leg swelling, or diarrhea. She has not been vaccinated for COVID. No history of smoking.     Review of Systems   Constitutional: Negative for fever.   Respiratory: Positive for cough and shortness of breath.    Cardiovascular: Negative for chest pain and leg swelling.   Gastrointestinal: Positive for abdominal pain and nausea. Negative for diarrhea.   All other systems reviewed and are negative.    Allergies:  Biotin  Mold    Medications:  Omeprazole  Tramadol  Trazodone?    Past Medical History:    Vitamin D deficiency  Spinal stenosis  Iron deficiency anemia  Gastrointestinal hemorrhage  Idiopathic scoliosis      Social History:  Lives at private residence with .  No history of smoking.   .     Physical Exam     Patient Vitals for the past 24 hrs:   BP Temp Temp src Pulse Resp SpO2 Weight   04/16/21 1915 -- -- -- -- -- 96 % --   04/16/21 1900 -- 97.5  F (36.4  C) -- 106 25 95 % --   04/16/21 1855 -- 97.2  F (36.2  C) -- 104 25 95 % --   04/16/21 1850 -- 96.6  F (35.9  C) -- 104 25 96 % --   04/16/21 1845 -- 96.1  F (35.6  C) -- 102 12 96 % --   04/16/21 1830 101/74 -- -- 112 19 97 % --   04/16/21 1800 99/75 -- -- -- -- -- --   04/16/21 1745 102/78 -- -- 109 19 97 % --   04/16/21 1730 (!) 106/91 -- -- 118 19 98 % --   04/16/21 1721 -- -- -- -- -- 98 % 52.6 kg (115 lb 15.4 oz)   04/16/21 1715 92/72 -- -- 105 (!) 35 90 % --   04/16/21 1705 (!)  108/98 -- -- 109 (!) 36 (!) 85 % --   04/16/21 1701 (!) 85/57 -- -- 103 (!) 36 91 % --   04/16/21 1655 98/45 -- -- 98 (!) 32 (!) 86 % --   04/16/21 1650 (!) 83/34 -- -- 105 (!) 38 (!) 82 % --   04/16/21 1645 (!) 56/37 -- -- 111 (!) 40 (!) 89 % --   04/16/21 1640 98/77 -- -- 110 (!) 35 99 % --   04/16/21 1630 97/70 -- -- 118 (!) 34 (!) 82 % --   04/16/21 1628 110/88 -- Rectal 116 (!) 35 (!) 86 % --   04/16/21 1625 (!) 59/37 -- -- 118 27 (!) 77 % --     Physical Exam  General: Alert, appears frail and elderly.  In severe respiratory distress.  HEENT:  Head:  Atraumatic  Ears:  External ears are normal  Mouth/Throat:  Oropharynx is without erythema or exudate and mucous membranes are dry.   Eyes:   Conjunctivae normal and EOM are normal. No scleral icterus.    Pupils are equal, round, and reactive to light.   CV:  Tachycardic rate, regular rhythm, normal heart sounds and radial pulses are 2+ and symmetric.  Resp:  Breath sounds are coarse bilaterally with diffuse crackles to bilateral bases.      Moderate laboring & retractions.    GI:  Abdomen is soft, no distension, epigastric tenderness. No rebound or guarding.  No CVA tenderness bilaterally  MS:  Normal range of motion. No edema.    Back atraumatic.    No midline cervical, thoracic, or lumbar tenderness  Skin:  Warm and dry.  No rash or lesions noted.  Neuro: Alert. Globally weak.  Sensation intact in all 4 extremities. GCS: 15  Psych:  Normal mood and affect.    Emergency Department Course   ECG  ECG taken at 1627, ECG read at 1630  Sinus tachycardia  Possible left atrial enlargement  Right bundle branch block  Left anterior fascicular block  **Bifascicular block**  Anterolateral infarct, age undetermined  Abnormal ECG   No prior for comparison  Rate 118 bpm. ME interval 136 ms. QRS duration 166 ms. QT/QTc 348/487 ms. P-R-T axes 51 -85 78.     Imaging:  XR chest port:   IMPRESSION: Hazy increased density throughout both lungs, greater on  the right, may be  infectious in etiology. There are likely small  bilateral pleural effusions. No pneumothorax. Pulmonary vascularity is  largely obscured by the bilateral lung opacities. Biapical scarring  has a similar appearance to the previous exam. Thoracic curve, convex  right.  Report per radiology     XR chest port (post intubation):  IMPRESSION: ET tube now projects in good position with its tip 5 cm above josse.  NG tube in place but its tip is only to the distal esophagus. NG tube should be in the advanced an additional 15 cm to place both the distal hole and side port into the stomach.    Diffuse bilateral interstitial prominence unchanged may represent a combination of fibrosis and interstitial edema. Hazy groundglass opacities could relate to pneumonia or alveolar edema. Heart size is normal. Lungs hyperinflated consistent with COPD.  Report per radiology     CT chest PE abdomen pelvis w contrast:  IMPRESSION:  1.  No PE.  2.  Abnormal enhancement of the left ventricle myocardium suggests ischemia.  3.  Extensive airspace disease throughout the lungs may represent infection and/or aspiration.  4.  Abnormal appearance of the kidneys. Renal infarcts are favored. This may be from hypoperfusion or embolic disease.  5.  An NG or OG tube terminates in the distal esophagus. Recommend advancing at least 12 cm for optimal positioning.   Report per radiology     Laboratory:  ISTAT gases lactate POCT (collected 1637): pH 7.01 (L), pCO2 47, pO2 27, bicarb 12 (L), O2 28, lactic acid 12.7 (H)   Troponin POCT (collected 1636): 19.44 (H)  Glucose by Meter (Collected 1639): 315 (H)  Glucose by Meter (Collected 1649): 304 (H)  Creatinine POCT: Creatinine 1.4 (H), GFR estimate 36 (L)    CBC: WBC 5.0, HGB 10.7 (L),    CMP: potassium 3.0 (L), carbon dioxide 13 (L), anion gap 21 (H), glucose 315 (H), creatinine 1.27 (H), GFR estimate 40 (L), calcium 8.3 (L), albumin 2.9 (L) o/w WNL    Troponin (Collected 1639): 51.909  Procalcitonin:  0.12  BNP: 5,965 (H)     Lactic acid (result time 1637) 12.7 (H)   Lactic acid (result time 1833) 13.1 (H)     Blood Culture x2: Pending    blood gas venous (collected 1637): pH: 7.02 (L), PCO2: 47, PO2: 27, Bicarbonate: 12   blood gas venous (collected 1641): pH: 6.96 (L), PCO2: 52 (H), PO2: 34, Bicarbonate: 12 (L), FIO2 100, base excess 19.7   blood gas arterial (collected 1742): pH: 6.94 (L), PCO2: 43, PO2: 140 (H), Bicarbonate: 9 (L), Oxyhgb: 94, FIO2 100, base excess 22.3   blood gas arterial (collected 1833): pH: 6.89 (L), PCO2: 62 (H), PO2: 39 (L), Bicarbonate: 12 (L), Oxyhgb: 35 (L), FIO2 100     UA with Microscopic: ketone trace, protein albumin 50, WBC 9 (H), RBC 5 (H), mucous present, hyaline casts 152 (H) o/w WNL  Urine culture aerobic bacterial: pending    Symptomatic Influenza A/B: negative  SARS-CoV2 (COVID19) Virus PCR Multiplex: positive     [facility]    -Intubation    Date/Time: 4/16/2021 5:05 PM  Performed by: Warren Galvan MD  Authorized by: Warren Galvan MD     ED EVALUATION:      Assessment Time: 4/16/2021 5:24 PM      I have performed an Emergency Department Evaluation including taking a history and physical examination, this evaluation will be documented in the electronic medical record for this ED encounter.    UNIVERSAL PROTOCOL   Site Marked: Yes  Prior Images Obtained and Reviewed:  Yes  Required items: Required blood products, implants, devices and special equipment available    Patient identity confirmed:  Hospital-assigned identification number  Patient was reevaluated immediately before administering moderate or deep sedation or anesthesia  Confirmation Checklist:  Patient's identity using two indicators, relevant allergies, procedure was appropriate and matched the consent or emergent situation and correct equipment/implants were available  Time out: Immediately prior to the procedure a time out was called    Universal Protocol: the [facility] Universal  Protocol was followed    Preparation: Patient was prepped and draped in usual sterile fashion          PRE-PROCEDURE DETAILS     Patient status:  Awake    Mallampati score:  II    Pretreatment medications:  None    Paralytics:  Rocuronium        SEDATION    Patient Sedated: Yes    Sedation Type:  Deep  Sedation:  Etomidate and propofol  Vital signs: Vital signs monitored during sedation    PROCEDURE DETAILS     Preoxygenation:  BiPAP    CPR in progress: no      Intubation method:  Oral    Oral intubation technique:  Video-assisted    Laryngoscope blade:  Mac 4    Tube size (mm):  7.0    Tube type:  Cuffed    Number of attempts:  1    Ventilation between attempts: no      Cricoid pressure: no      Tube visualized through cords: yes      PLACEMENT ASSESSMENT     ETT to teeth:  22    Tube secured with:  ETT sloan    Breath sounds:  Equal and absent over the epigastrium    Placement verification: chest rise, condensation, CXR verification, direct visualization, equal breath sounds, ETCO2 detector and tube exhalation      CXR findings:  ETT in proper place  PROCEDURE   Patient Tolerance:  Patient tolerated the procedure well with no immediate complications    [facility]    -Central Line    Date/Time: 4/16/2021 5:34 PM  Performed by: Warren Galvan MD  Authorized by: Warren Galvan MD        ANESTHESIA    Anesthesia: Local infiltration  Local Anesthetic:  Lidocaine 1% without epinephrine      PRE-PROCEDURE DETAILS:     Hand hygiene: Hand hygiene performed prior to insertion      Sterile barrier technique: All elements of maximal sterile technique followed      Skin preparation:  ChloraPrep    Skin preparation agent: Skin preparation agent completely dried prior to procedure      PROCEDURE DETAILS:     Location:  L femoral    Patient position:  Flat    Procedural supplies:  Triple lumen    Catheter size:  7.5 Fr    Landmarks identified: yes      Ultrasound guidance: yes      Sterile ultrasound  techniques: Sterile gel and sterile probe covers were used      Number of attempts:  1    Successful placement: yes      POST PROCEDURE DETAILS:      Post-procedure:  Dressing applied and line sutured    Assessment:  Blood return through all ports, no pneumothorax on x-ray, free fluid flow and placement verified by x-ray  PROCEDURE   Patient Tolerance:  Patient tolerated the procedure well with no immediate complications    [facility]    -Arterial Line    Date/Time: 4/16/2021 5:34 PM  Performed by: Warren Galvan MD  Authorized by: Warren Galvan MD       INDICATIONS:   Indications: hemodynamic monitoring and multiple ABGs      PRE-PROCEDURE DETAILS:   Skin preparation:  2% Chlorhexidine  PROCEDURE DETAILS:    Location:  L femoral  Barrie's test performed: no    Needle gauge:  20 G  Placement technique:  Seldinger and ultrasound guided  Number of attempts:  1  Transducer: waveform confirmed      POST PROCEDURE DETAILS:    Post-procedure:  Sutured  CMS:  Normal    PROCEDURE   Patient Tolerance:  Patient tolerated the procedure well with no immediate complications        Emergency Department Course:    1618 The patient arrived via EMS with non re breather mask in place. EMS report obtained.     1620 I obtained history and examined the patient as noted above.     1625 I reviewed past medical history and care everywhere    1628 The patient was placed on biPAP.    1640 I performed a bed side US.     1650 Levophed 0.03 mcg/kg/min IV    1650 I spoke with the patient's daughter regarding patient's presentation and plan of care.     1706 I consulted with Dr. Luz of cardiology.     1716 Rocuronium 50 mg IV    1717 Etomidate 15 mg IV    1720 Zosyn 4.5 g IV. Epinephrine 0.03 mcg/kg/min.     1721 Propofol 15 mcg/kg/min IV    1724 I intubated the patient.     1734 Central and arterial line placed.     1842 Vancomycin 1,250 mg IV    1813 I consulted with Dr. Luz of cardiology.     1837 [facility]     1839 I consulted with Dr. Luz of cardiology.     1840 Cath lab activated.     1845 I consulted with Dr. Henriquez of interventional cardiology at [facility].     1902 I rechecked the patient and spoke with patient's family.     1904 I consulted with [facility] interventional cardiology.     1917 I consulted with Dr. Cabrales of [facility] ED.     1918 Aspirin 300 mg rectal.     1929 I rechecked the patient and updated the patient's family.    Disposition:  The patient was transferred to [facility] ED via EMS.     Impression & Plan     CMS Diagnoses:   The patient has signs of septic shock as evidenced by:  1. Presence of Sepsis, AND  2. Lactic Acidosis with value greater than or equal to 4 and Persistent hypotension defined by the last 2 BP readings within the ONE HOUR following completion of the 30mL/kg bolus being low (SBP <90 or MAP <65)    Time septic shock diagnosis confirmed = 1641 04/16/21   as this was the time when Lactate was resulted and the level was greater than or equal to 4    3 Hour Septic Shock Bundle Completion:  1. Initial Lactic Acid Result:   Recent Labs   Lab Test 04/16/21  1833 04/16/21  1637   LACT 13.1* 12.7*     2. Blood Cultures before Antibiotics: Yes  3. Broad Spectrum Antibiotics Administered:  yes       Anti-infectives (From admission through now)    Start     Dose/Rate Route Frequency Ordered Stop    04/16/21 1650  vancomycin 1250 mg in 0.9% NaCl 250 mL intermittent infusion 1,250 mg      1,250 mg  over 90 Minutes Intravenous ONCE 04/16/21 1648 04/16/21 1919 04/16/21 1640  piperacillin-tazobactam (ZOSYN) intermittent infusion 4.5 g      4.5 g  over 30 Minutes Intravenous ONCE 04/16/21 1640 04/16/21 1913          4. IF patient is in septic shock within 6 hours of time zero, as defined by:  -Initial Hypotension:  2 low BP readings (SBP <90, MAP <65, or decrease > 40 from baseline due to infection) within 3 hrs of each other during the time period of 6hrs before  and 6 hrs  after time zero  -Lactate > or = 4  THEN: Fluid volume administered in ED:  Full bolus NOT administered due to CHF and acute Pulmonary Edema    BMI Readings from Last 1 Encounters:   04/16/21 19.75 kg/m      30 mL/kg fluids based on weight: 1,580 mL  30 mL/kg fluids based on IBW (must be >= 60 inches tall): Patient ideal weight not available.    Septic Shock reassessment:  1. Repeat Lactic Acid Level: 13.1  2. Vasopressors started for Persistent Hypotension defined by the last 2 BP readings within the ONE HOUR following completion of the 30mL/kg bolus being low (SBP <90 or MAP <65).    I attest to having performed a repeat sepsis exam and assessment of perfusion at 1833 and the results demonstrate worsening perfusion.    Medical Decision Making:  Patient is a 80-year-old female with no known past medical history who presents with acute shortness of breath starting last night with progression into today.  Patient with an undetectable SPO2 by EMS and ultimately arrives in severe respiratory distress.  Patient was immediately placed on BiPAP while further initial assessment was undertaken.  Patient denies any chest pain, abdominal pain, or fevers.  She has not received COVID-19 vaccination.  We did perform a broad septic and cardiac work-up for the patient.  I initially had concern for ACS versus pulmonary embolism versus infection including COVID versus bacterial pneumonia versus diabetic ketoacidosis versus aortic dissection or aortic rupture.  Patient was initially hypotensive on arrival and we immediately started peripheral Levophed to assist with unspecified hypotension.  Initial VBG concerning for severe metabolic acidosis in the setting of severe respiratory distress.  Initial lactate 12.7.  Broad-spectrum antibiotics were initiated after early recognition of suspected septic shock in the setting of severe respiratory distress.  Patient's initial EKG showed a wide-complex rhythm concerning for either  bifascicular block versus a right bundle branch block.  Challenging to interpret ST segment changes with a wide QRS but initial i-STAT troponin was placed in process.  Initial troponin at bedside returned elevated at 19.44.  I did contact cardiology at this time who reviewed the patient case.  Patient remained in respiratory distress on BiPAP with significant hypotension.  I elected to intubate the patient after providing peripheral vasopressor medications, levophed, and push dose epinephrine to assist with rapid sequence intubation.  Please see procedure note.  Central line and arterial line were placed in the left groin.  Patient was transitioned to epinephrine and Levophed was continued in central line.  Patient seemed much more responsive to epinephrine and a high concern for potential cardiac injury associated with her presentation today.  Patient was sent for emergent imaging of the chest abdomen and pelvis to evaluate for potential pulmonary embolism or other sinister intrathoracic or intra-abdominal pathology.  Unfortunately CT imaging showed diffuse lung disease as well as potential renal infarcts from hypoperfusion or embolic disease.  I do have high suspicion that patient has an acute infectious process and at this time COVID-19 swab returned positive.  I do suspect patient had COVID-19 leading to significant pulmonary disease which eventually progressed to severe cardiac injury which appears to be ongoing with elevated troponin, hypotension and poor peripheral perfusion to extremities.  The formal troponin study returned at 51.9.  I spoke with the interventional cardiologist, , at [facility] as well as the cardiologist  who recommended Cath Lab activation as no evidence of pulmonary embolism on CT imaging, but rather significant signs of cardiac injury in addition to diffuse pulmonary disease/COVID19.  With no known cardiac history, it would be appropriate  for the patient to have an emergent catheterization as patient is critically ill at this time and high suspicion for ACS particularly with ongoing hypotension and cardiogenic shock presentation.  Unfortunately, no ICU beds were available at [facility] and the Cath Lab could not accept patient at [facility].  I did discuss the patient with the interventional cardiologist at [facility], Dr. Ceballos, and reviewed the patient's presentation with her.  Over the phone she did not feel comfortable taking the patient directly to the Cath Lab at this time but would prefer to evaluate her in the emergency department or in the intensive care unit prior to Cath Lab activation.  Unfortunately I have no Cath Lab available here at [facility] and it would be unsafe to keep the patient here if she were to need Cath Lab intervention within the next several hours.  Unfortunately, no ICU beds are available at [facility] for direct admission and in discussion with interventional cardiology & emergency department physician Dr. Alonzo Cabrales, we ultimately felt the best scenario for the patient would be to transfer to the emergency department at [facility], so interventional cardiology can fully assess and evaluate the patient to determine final disposition of whether she would benefit from cardiac catheterization prior to ICU admission.  I spoke to the patient's family at length in regards to Zo's critical presentation, particularly her COVID-19 diagnosis and high potential for mortality with such significant pulmonary and cardiac disease.  Decision makers for the patient including  and daughter will drive to [facility] for further discussion and monitoring of the patient's status and definitive disposition.  Transferred by critical care ambulance to [facility] for cardiology assessment and definitive management.     Critical Care  Time: was 185 minutes for this patient excluding procedures    Covid-19  Zo Chan was evaluated during a global COVID-19 pandemic, which necessitated consideration that the patient might be at risk for infection with the SARS-CoV-2 virus that causes COVID-19.   Applicable protocols for evaluation were followed during the patient's care.   COVID-19 was considered as part of the patient's evaluation. The plan for testing is:  a test was obtained during this visit.    Diagnosis:    ICD-10-CM    1. Cardiogenic shock (H)  R57.0    2. Shortness of breath  R06.02 Symptomatic Influenza A/B & SARS-CoV2 (COVID-19) Virus PCR Multiplex     Troponin I (now)     CBC with platelets differential     Comprehensive metabolic panel     Blood gas venous     Blood culture     Blood culture     Procalcitonin     UA with Microscopic     Urine Culture Aerobic Bacterial     BNP     ISTAT gases lactate dennys POCT     ISTAT gases lactate dennys POCT     Troponin POCT     Troponin POCT     Creatinine POCT     Creatinine POCT     Glucose by meter     Glucose by meter     Blood gas arterial and oxyhgb     Lactic acid whole blood     Blood gas arterial and oxyhgb     CANCELED: Lactic acid whole blood     CANCELED: Lactic acid whole blood   3. Hypoxia  R09.02    4. 2019 novel coronavirus disease (COVID-19)  U07.1    5. Hypotension, unspecified hypotension type  I95.9    6. Septic shock (H)  A41.9     R65.21    7. Elevated troponin  R77.8    8. ST elevation myocardial infarction (STEMI), unspecified artery (H)  I21.3        Scribe Disclosure:  IRegla, am serving as a scribe at 4:26 PM on 4/16/2021 to document services personally performed by Warren Galvan MD based on my observations and the provider's statements to me.         Warren Galvan MD  04/16/21 1705

## 2021-04-16 NOTE — PATIENT INSTRUCTIONS
Recommend daily antihistamine such as Claritin to help with allergy symptoms. Try to not take Sudafed since this can raise your blood pressure.    Try to ease up on laxative use over the weekend to see if this helps    Follow-up next week if no improvement, sooner if worsening

## 2021-04-16 NOTE — Clinical Note
The first balloon was inserted into the circumflex.Max pressure = 12 patricia. Total duration = 5 seconds.

## 2021-04-16 NOTE — Clinical Note
Balloon inserted to circumflex. Detail Level: Simple Detail Level: Generalized Detail Level: Detailed

## 2021-04-16 NOTE — PROGRESS NOTES
Respiratory Therapy Note    Patient initially started on BiPAP therapy at 1630. A BiPAP of 14/7 @ 100% was applied to the pt via the mask for an increase in WOB and/or SOB. The bridge of the nose looked good. Pt is tolerating it well.     At 1700 Pt was becoming more unstable. Patient intubated at 1721 per MD order. Patient started on initial ventilator settings:    Ventilation Mode: CMV/AC  (Continuous Mandatory Ventilation/ Assist Control)  FiO2 (%): 100 %  Rate Set (breaths/minute): 20 breaths/min  Tidal Volume Set (mL): 375 mL  PEEP (cm H2O): 5 cmH2O  Oxygen Concentration (%): 100 %  Resp: (!) 36    RR 20, breath sounds equal bilaterally, and SpO2 98%. Patient intubated with 7.0 ETT and secured at 22 cm at the teeth. Bilateral breath sounds heard, end tidal had appropriate color change, and CXR ordered. HEPA filter currently in place. RT to follow.     Minna Jessica, RT  5:21 PM April 16, 2021

## 2021-04-17 LAB
BACTERIA SPEC CULT: NO GROWTH
BACTERIA SPEC CULT: NO GROWTH
Lab: NORMAL
SPECIMEN SOURCE: NORMAL
SPECIMEN SOURCE: NORMAL

## 2021-04-17 NOTE — H&P
[facility]    Cardiology Consultation history and physical    Date of Admission:  4/16/2021    Assessment & Plan   Zo Chan is a 80 year old female who was admitted on 4/16/2021. I was asked to see the patient for shock, possible ACS.    1. Cardiogenic shock due to anterior STEMI  2. Asystole cardiac arrest followed by PEA.  3. Occlusion of the ostial LAD.  4. PCI of the ostial-proximal LAD with Synergy JULIEN x 1 and balloon predilation of the ostial LCx.  5. Covid 19  6. Death due to MI with cardiac arrest    Christa Ceballos MD    Code Status    No Order      Chief Complaint   79 yo female presented to the ED at [facility] with dyspnea.       History of Present Illness   Zo Chan is a 80 year old female who presents with dyspnea. She has reported h/o rheumatic heart disease at age 6 with no known sequelae per her . She was otherwise healthy. It appears by her med list she also had GERD.     She presented to [facility] ED with dyspnea.  She had been ill for about 2 weeks and acute worsening of dyspnea the day prior. In the ED she had worsening compromise of her respiratory status and hypotension and was intubated. Her initial trop was 19 and lactate was 12.  Subsequent troponin was >50.  A CT chest showed no PE by was suggestive of myocardial ischemia and renal infarcts vs hypoperfusion. Her EKG showed bifascicular block with wide QRS and anterior ST elevation.  Family expressed preference for aggressive measures. She was going to be transferred to the [facility], but no beds were available.  Cath lab at [facility] was activated.I did speak with her  on the phone prior to the patient's arrival at [facility] and he confirmed that family preferred full resuscitation effort.  Due to her severe critical illness I requested to evaluate her in the ED. On arrival I did meet the EMS services in the ED and took her to the cath lab without registration to the ED.  She was critically hypotensive on epi and propofol drips. Propofol was stopped. Left femoral arterial access had been obtained in the ED. RFA access and RFV access was obtained.  Coronary angiography showed occluded ostial LAD and severe stenosis of the ostial LCX and no significant disease in the RCA. She had received asa and was given heparin and cangrelor. Her hypotension continued to worsen. Epi was titrated and she was bolused with jaguar and dopamine was ordered.  Team prepared for impella placement, though her peripheral vessels appeared small on ultrasound (additional imaging would be needed). She developed worsening hypoxemia and PEA.  CPR was initiated. Code blue was called. The LAD was balloon dilated, the LCx was balloon dilated and the LAD was stented.  The distal vessel had no reflow.  Resuscitation efforts continued for 30 minutes with PCI and rhythm remained asystole and PEA.  After 30 min of resuscitation the efforts were discontinued. Time of death was 9:05PM.     Past Medical History   Unable due to critical illness. Family reports she was healthy with no significant PMH.         Prior to Admission Medications   Prior to Admission Medications   Prescriptions Last Dose Informant Patient Reported? Taking?   Cholecalciferol (VITAMIN D) 2000 UNITS tablet   Yes No   Sig: Take 2 tabs po qd.   Loratadine-Pseudoephedrine (CLARITIN-D 12 HOUR PO)   Yes No   Sig: Take 1 tablet by mouth as needed   naloxone (NARCAN) 4 MG/0.1ML nasal spray   No No   Sig: Spray 1 spray (4 mg) into one nostril alternating nostrils once as needed for opioid reversal Every 2-3 minutes until patient responsive or EMS arrives   Patient not taking: Reported on 10/8/2020   omeprazole (PRILOSEC) 20 MG DR capsule   No No   Sig: TAKE 1 CAPSULE BY MOUTH EVERY DAY   traMADol (ULTRAM) 50 MG tablet   Yes No   Sig: TAKE 1 2 TABLETS (50 100 MG) BY MOUTH EVERY 6 HOURS AS NEEDED FOR MODERATE PAIN   triamcinolone (KENALOG) 0.1 % ointment   No No    Sig: Apply sparingly to affected area BID      Facility-Administered Medications: None     Allergies   Allergies   Allergen Reactions     Biotin      Severe headache      Mold      Other reaction(s): itchy throat       Social History       Family History   Unable due to critical illness    Review of Systems   Unable     Physical Exam                 O2 Device: Mechanical Ventilator    Vital Signs with Ranges  Temp:  [96.1  F (35.6  C)-97.5  F (36.4  C)] 97.5  F (36.4  C)  Pulse:  [] 106  Resp:  [12-40] 25  BP: ()/(34-98) 101/74  MAP:  [69 mmHg] 69 mmHg  Arterial Line BP: (83)/(55) 83/55  FiO2 (%):  [100 %] 100 %  SpO2:  [77 %-99 %] 96 %  0 lbs 0 oz    Constitutional: acutely ill, intubated  Eyes: pupils pinpoint  ENT: ETT tube in place  Respiratory: mechanical ventilation  Cardiovascular: unable due to mechanical vent  GI: soft  Skin: 1+ pedal edema bilat  Musculoskeletal: petite, decreased muscle mass  Neurologic: sedated  Psychiatric: unable    Data   I personally reviewed the following studies chest CT, EKG    Other pertinent results all labs were reviewed. Severe lactic acidosis.

## 2021-04-17 NOTE — DEATH PRONOUNCEMENT
MD DEATH PRONOUNCEMENT    Called to pronounce Zo Chan dead.    Physical Exam: Unresponsive to noxious stimuli, Spontaneous respirations absent, Breath sounds absent, Carotid pulse absent, Heart sounds absent, Pupillary light reflex absent and Corneal blink reflex absent    Patient was pronounced dead at 9:05:PM , 2021.    Preliminary Cause of Death: anterior MI, Covid    Active Problems:    * No active hospital problems. *       Infectious disease present?: YES    Communicable disease present? (examples: HIV, chicken pox, TB, Ebola, CJD) :  YES COVID-19    Multi-drug resistant organism present? (example: MRSA): NO    Please consider an autopsy if any of the following exist:  NO Unexpected or unexplained death during or following any dental, medical, or surgical diagnostic treatment procedures.   NO Death of mother at or up to seven days after delivery.     NO All  and pediatric deaths.     NO Death where the cause is sufficiently obscure to delay completion of the death certificate.   NO Deaths in which autopsy would confirm a suspected illness/condition that would affect surviving family members or recipients of transplanted organs.     The following deaths must be reported to the 's Office:  NO A death that may be due entirely or in part to any factors other than natural disease (recent surgery, recent trauma, suspected abuse/neglect).   NO A death that may be an accident, suicide, or homicide.     NO Any sudden, unexpected death in which there is no prior history of significant heart disease or any other condition associated with sudden death.   NO A death under suspicious, unusual, or unexpected circumstances.    NO Any death which is apparently due to natural causes but in which the  does not have a personal physician familiar with the patient s medical history, social, or environmental situation or the circumstances of the terminal event.   NO Any death apparently  due to Sudden Infant Death Syndrome.     YES Deaths that occur during, in association with, or as consequences of a diagnostic, therapeutic, or anesthetic procedure.   NO Any death in which a fracture of a major bone has occurred within the past (6) six months.     NO A death of persons note seen by their physician within 120 days of demise.     NO Any death in which the  was an inmate of a public institution or was in the custody of Law Enforcement personnel.   NO  All unexpected deaths of children   NO Solid organ donors   NO Unidentified bodies   pending Deaths of persons whose bodies are to be cremated or otherwise disposed of so that the bodies will later be unavailable for examination;   NO Deaths unattended by a physician outside of a licensed healthcare facility or licensed residential hospice program   NO Deaths occurring within 24 hours of arrival to a health care facility if death is unexpected.    NO Deaths associated with the decedent s employment.   NO Deaths attributed to acts of terrorism.   NO Any death in which there is uncertainty as to whether it is a medical examiner s care should be discussed with the medical investigator.        Body disposition: kimberly

## 2021-04-17 NOTE — ED PROVIDER NOTES
Called to [facility] to await patient arrival from [facility]. COVID + STEMI with respiratory failure and shock. On epi drip, intubated on propofol. Dr. Ceballos arrived with cath lab team prior to patient arrival. Discussed in advance. Patient eyeballed in the hallway - hypotensive but saturating with normal HR. Dr. Ceballos took patient straight to cath lab without further evaluation in the ED.      Zohreh Marie MD  04/16/21 [id number]

## 2021-04-17 NOTE — ED NOTES
Patient arrived via ems with resp. Distress, on nonrebreather switched to bipap. Alert talking , but extremely dyspneic. Hypotensive .  at bedside , Dr. Galvan spoke with  and decision to intubate was made . Pt. Intubated , triple lumen placed per DR. Galvan along with an art. Line , pressors started. Report given to [facility] ER pt. Taken to [facility] via ems . Stable

## 2021-04-17 NOTE — CODE/RAPID RESPONSE
[facility]    House LEN CODE BLUE Note  4/16/2021   Time Called: 2031    Assessment & Plan     Acute asystolic cardiac arrest 2/2 anterior STEMI, cardiogenic shock.  - CODE BLUE activated 2031 while pt in cath lab for acute asystolic cardiac arrest (pt previously intubated prior to procedure).  At time of arrival, pt had received 1 amp epinephrine and interventional cardiologist performing cardiac catheterization.  Assumed management of CODE BLUE efforts while Dr. Ceballos continued with cardiac catheterization.  ACLS protocol was continued.  Pt remained on Isidro throughout duration of CODE BLUE efforts.  Pt was continued on epinephrine and dopamine infusions as max doses.  Pt was also administered 3 amps Bicarbonate, 7 total amps epinephrine, 2 amps calcium.  Pulse checks were performed at the guidance of Dr. Ceballos with subsequent pulse checks noting PEA.  Dr. Ceballos updated pt's family regarding pt's clinical status.  After discussion with pt's family, Dr. Ceballos pronounced Ms. Zo Chan dead at 2105 after 29 min of cardiopulmonary resuscitative efforts.    INTERVENTIONS:  - ALCS protocol followed; administered 3 amps Bicarbonate, 7 total amps epinephrine, 2 amps calcium    Discussed with and defer further cares to nursing and Dr. Ceballos, interventional cardiology.  Curbside discussion with intensivist present in cath lab as well, appreciate recommendations.    Interval History     Zo Chan is a 80 year old female who was admitted on 4/16/2021 for SOB.    Medical history significant for: SADIE, GIB, recent COVID 19     Code Status: No Order    Allergies   Allergies   Allergen Reactions     Biotin      Severe headache      Mold      Other reaction(s): itchy throat       Physical Exam   Vital Signs with Ranges:  Temp:  [96.1  F (35.6  C)-97.5  F (36.4  C)] 97.5  F (36.4  C)  Pulse:  [] 106  Resp:  [12-40] 25  BP: ()/(34-98) 101/74  MAP:  [69 mmHg] 69  mmHg  Arterial Line BP: (83)/(55) 83/55  FiO2 (%):  [100 %] 100 %  SpO2:  [77 %-99 %] 96 %  No intake/output data recorded.    Constitutional: Pt on cardiac catheterization table, SHANKAR in place, ETT in place  Neck: Sanguinous secretions noted  Pulmonary: No obvious respiratory effort at pulse checks  Cardiovascular: Pale, SHANKAR in place  GI: HORTENCIA  Skin/Integumen: Cool  Neuro: Unresponsive  Psych:  HORTENCIA  Extremities: Pale      Time Spent on this Encounter   I spent 39 minutes (2036 - 2115) of critical care time on the unit/floor managing the care of Zo Chan. Upon evaluation, this patient had a high probability of imminent or life-threatening deterioration due to acute cardiopulmonary arrest, which required my direct attention, intervention, and personal management. 100% of my time was spent at the bedside counseling the patient and/or coordinating care regarding services listed in this note.    JIMMY [facility] LEN

## 2021-04-17 NOTE — DISCHARGE SUMMARY
Discharge summary    Zo Chan age 80  in the cardiac catheterization lab due to MI with cardiac arrest. Time of Death was 9:05PM.    Family was updated.    Please see H and P for additional details.     Christa Ceballos MD on 2021 at 10:07 PM

## 2021-04-19 LAB — INTERPRETATION ECG - MUSE: NORMAL

## 2021-04-22 LAB
BACTERIA SPEC CULT: NO GROWTH
SPECIMEN SOURCE: NORMAL

## (undated) DEVICE — INTRO TERUMO 6FRX25CM W/MARKER RSB603

## (undated) DEVICE — TOTE ANGIO CORP PC15AT SAN32CC83O

## (undated) DEVICE — GUIDEWIRE VASC 0.014INX180CM RUNTHROUGH 25-1011

## (undated) DEVICE — INTRODUCER CATH VASC 5FRX10CM  MPIS-501-NT-U-SST

## (undated) DEVICE — KIT HAND CONTROL ANGIOTOUCH ACIST 65CM AT-P65

## (undated) DEVICE — MANIFOLD KIT ANGIO AUTOMATED 014613

## (undated) DEVICE — CATH ANGIO INFINITI 3DRC 6FRX100CM 534676T

## (undated) DEVICE — CATH BALLOON EMERGE 2.0X15MM H7493918915200

## (undated) DEVICE — SYR ANGIOGRAPHY MULTIUSE KIT ACIST 014612

## (undated) DEVICE — INTRO SHEATH 6FRX10CM PINNACLE RSS602

## (undated) DEVICE — NDL PERC ENTRY THINWALL 18GA 7.0" G00166

## (undated) DEVICE — VALVE HEMOSTASIS .096" COPILOT MECH 1003331

## (undated) DEVICE — DEFIB PRO-PADZ LVP LQD GEL ADULT 8900-2105-01